# Patient Record
Sex: MALE | Race: BLACK OR AFRICAN AMERICAN | NOT HISPANIC OR LATINO | Employment: OTHER | URBAN - METROPOLITAN AREA
[De-identification: names, ages, dates, MRNs, and addresses within clinical notes are randomized per-mention and may not be internally consistent; named-entity substitution may affect disease eponyms.]

---

## 2019-02-13 ENCOUNTER — HISTORICAL (OUTPATIENT)
Dept: ADMINISTRATIVE | Facility: HOSPITAL | Age: 54
End: 2019-02-13

## 2019-02-13 LAB
ABS NEUT (OLG): 3.59 X10(3)/MCL (ref 2.1–9.2)
ALBUMIN SERPL-MCNC: 4.2 GM/DL (ref 3.4–5)
ALBUMIN/GLOB SERPL: 1.2 {RATIO}
ALP SERPL-CCNC: 55 UNIT/L (ref 50–136)
ALT SERPL-CCNC: 18 UNIT/L (ref 12–78)
AST SERPL-CCNC: 12 UNIT/L (ref 15–37)
BASOPHILS # BLD AUTO: 0 X10(3)/MCL (ref 0–0.2)
BASOPHILS NFR BLD AUTO: 0 %
BILIRUB SERPL-MCNC: 0.5 MG/DL (ref 0.2–1)
BILIRUBIN DIRECT+TOT PNL SERPL-MCNC: 0.1 MG/DL (ref 0–0.2)
BILIRUBIN DIRECT+TOT PNL SERPL-MCNC: 0.4 MG/DL (ref 0–0.8)
BUN SERPL-MCNC: 7 MG/DL (ref 7–18)
CALCIUM SERPL-MCNC: 8.9 MG/DL (ref 8.5–10.1)
CHLORIDE SERPL-SCNC: 109 MMOL/L (ref 98–107)
CO2 SERPL-SCNC: 29 MMOL/L (ref 21–32)
CREAT SERPL-MCNC: 1.15 MG/DL (ref 0.7–1.3)
EOSINOPHIL # BLD AUTO: 0 X10(3)/MCL (ref 0–0.9)
EOSINOPHIL NFR BLD AUTO: 0 %
ERYTHROCYTE [DISTWIDTH] IN BLOOD BY AUTOMATED COUNT: 14.4 % (ref 11.5–17)
GLOBULIN SER-MCNC: 3.4 GM/DL (ref 2.4–3.5)
GLUCOSE SERPL-MCNC: 68 MG/DL (ref 74–106)
HCT VFR BLD AUTO: 45.5 % (ref 42–52)
HGB BLD-MCNC: 14.3 GM/DL (ref 14–18)
LYMPHOCYTES # BLD AUTO: 2.6 X10(3)/MCL (ref 0.6–4.6)
LYMPHOCYTES NFR BLD AUTO: 40 %
MCH RBC QN AUTO: 26.2 PG (ref 27–31)
MCHC RBC AUTO-ENTMCNC: 31.4 GM/DL (ref 33–36)
MCV RBC AUTO: 83.3 FL (ref 80–94)
MONOCYTES # BLD AUTO: 0.3 X10(3)/MCL (ref 0.1–1.3)
MONOCYTES NFR BLD AUTO: 5 %
NEUTROPHILS # BLD AUTO: 3.59 X10(3)/MCL (ref 2.1–9.2)
NEUTROPHILS NFR BLD AUTO: 54 %
PLATELET # BLD AUTO: 192 X10(3)/MCL (ref 130–400)
PMV BLD AUTO: 10.2 FL (ref 9.4–12.4)
POTASSIUM SERPL-SCNC: 3.9 MMOL/L (ref 3.5–5.1)
PROT SERPL-MCNC: 7.6 GM/DL (ref 6.4–8.2)
PSA SERPL-MCNC: 0.29 NG/ML (ref 0–4)
RBC # BLD AUTO: 5.46 X10(6)/MCL (ref 4.7–6.1)
SODIUM SERPL-SCNC: 145 MMOL/L (ref 136–145)
WBC # SPEC AUTO: 6.6 X10(3)/MCL (ref 4.5–11.5)

## 2022-07-11 ENCOUNTER — HOSPITAL ENCOUNTER (EMERGENCY)
Facility: HOSPITAL | Age: 57
Discharge: HOME | End: 2022-07-11
Attending: FAMILY MEDICINE
Payer: COMMERCIAL

## 2022-07-11 VITALS
WEIGHT: 183 LBS | HEART RATE: 73 BPM | SYSTOLIC BLOOD PRESSURE: 157 MMHG | BODY MASS INDEX: 24.79 KG/M2 | RESPIRATION RATE: 20 BRPM | TEMPERATURE: 98 F | OXYGEN SATURATION: 98 % | DIASTOLIC BLOOD PRESSURE: 97 MMHG | HEIGHT: 72 IN

## 2022-07-11 DIAGNOSIS — M54.50 ACUTE EXACERBATION OF CHRONIC LOW BACK PAIN: Primary | ICD-10-CM

## 2022-07-11 DIAGNOSIS — G89.29 ACUTE EXACERBATION OF CHRONIC LOW BACK PAIN: Primary | ICD-10-CM

## 2022-07-11 PROCEDURE — 25000003 PHARM REV CODE 250: Performed by: PHYSICIAN ASSISTANT

## 2022-07-11 PROCEDURE — 63600175 PHARM REV CODE 636 W HCPCS: Performed by: PHYSICIAN ASSISTANT

## 2022-07-11 PROCEDURE — 99284 EMERGENCY DEPT VISIT MOD MDM: CPT | Mod: 25

## 2022-07-11 PROCEDURE — 96372 THER/PROPH/DIAG INJ SC/IM: CPT | Performed by: PHYSICIAN ASSISTANT

## 2022-07-11 RX ORDER — NALTREXONE HYDROCHLORIDE 50 MG/1
25 TABLET, FILM COATED ORAL DAILY
COMMUNITY
Start: 2022-07-05

## 2022-07-11 RX ORDER — BUSPIRONE HYDROCHLORIDE 30 MG/1
30 TABLET ORAL 2 TIMES DAILY
COMMUNITY
Start: 2022-07-05

## 2022-07-11 RX ORDER — MIRTAZAPINE 30 MG/1
30 TABLET, FILM COATED ORAL NIGHTLY
COMMUNITY
Start: 2022-07-05

## 2022-07-11 RX ORDER — METHOCARBAMOL 500 MG/1
1000 TABLET, FILM COATED ORAL
Status: COMPLETED | OUTPATIENT
Start: 2022-07-11 | End: 2022-07-11

## 2022-07-11 RX ORDER — PALIPERIDONE PALMITATE 546 MG/1.75ML
1 INJECTION, SUSPENSION, EXTENDED RELEASE INTRAMUSCULAR
COMMUNITY
Start: 2022-05-24

## 2022-07-11 RX ORDER — PRAVASTATIN SODIUM 20 MG/1
TABLET ORAL
COMMUNITY
Start: 2022-07-05

## 2022-07-11 RX ORDER — BENZTROPINE MESYLATE 1 MG/1
1 TABLET ORAL DAILY
COMMUNITY
Start: 2022-07-05

## 2022-07-11 RX ORDER — AMOXICILLIN 500 MG/1
500 CAPSULE ORAL 3 TIMES DAILY
COMMUNITY
Start: 2022-07-05

## 2022-07-11 RX ORDER — MORPHINE SULFATE 4 MG/ML
4 INJECTION, SOLUTION INTRAMUSCULAR; INTRAVENOUS
Status: COMPLETED | OUTPATIENT
Start: 2022-07-11 | End: 2022-07-11

## 2022-07-11 RX ORDER — DIVALPROEX SODIUM 500 MG/1
500 TABLET, FILM COATED, EXTENDED RELEASE ORAL NIGHTLY
COMMUNITY
Start: 2022-07-05

## 2022-07-11 RX ORDER — PALIPERIDONE 3 MG/1
3 TABLET, EXTENDED RELEASE ORAL DAILY
COMMUNITY
Start: 2022-06-06

## 2022-07-11 RX ORDER — DEXAMETHASONE SODIUM PHOSPHATE 4 MG/ML
8 INJECTION, SOLUTION INTRA-ARTICULAR; INTRALESIONAL; INTRAMUSCULAR; INTRAVENOUS; SOFT TISSUE
Status: COMPLETED | OUTPATIENT
Start: 2022-07-11 | End: 2022-07-11

## 2022-07-11 RX ORDER — BUPROPION HYDROCHLORIDE 150 MG/1
150 TABLET ORAL EVERY MORNING
COMMUNITY
Start: 2022-07-05

## 2022-07-11 RX ADMIN — DEXAMETHASONE SODIUM PHOSPHATE 8 MG: 4 INJECTION, SOLUTION INTRA-ARTICULAR; INTRALESIONAL; INTRAMUSCULAR; INTRAVENOUS; SOFT TISSUE at 04:07

## 2022-07-11 RX ADMIN — METHOCARBAMOL 1000 MG: 500 TABLET ORAL at 04:07

## 2022-07-11 RX ADMIN — MORPHINE SULFATE 4 MG: 4 INJECTION, SOLUTION INTRAMUSCULAR; INTRAVENOUS at 04:07

## 2022-07-11 NOTE — ED PROVIDER NOTES
Encounter Date: 7/11/2022       History     Chief Complaint   Patient presents with    Back Pain     Acute on chronic back pain that started last night that radiates down BL legs, pt states that he hurt his back on job in October 2021, and has back pain that is intermittent ever since, pt denies any trauma, states that he was sitting in a chair then needed help up when the pain started     56 y.o. male presents to the ED with neck flare-up of low back pain onset yesterday with radiation to bilateral posterior lower extremities.  Patient states he felt a sudden pain when standing up from a seated position.  Denies any active bleeding, trauma, bowel or bladder incontinence, saddle paresthesia.  Patient states he does see pain management for his neck.  Took Norco 10 today with relief for only 1 hour. States he was seeing surgeon after initial injury however has not been. Does not see PCP until August.     The history is provided by the patient. No  was used.   Back Pain   This is a chronic problem. The current episode started yesterday. The problem occurs throughout the day. The problem has been unchanged. The pain is associated with a recent injury. The pain is present in the lumbar spine. The quality of the pain is described as shooting. The pain radiates to the left leg and right leg. The pain is the same all the time. Stiffness is present all day. Pertinent negatives include no chest pain, no fever, no bowel incontinence, no perianal numbness, no bladder incontinence, no dysuria, no pelvic pain, no paresthesias, no paresis and no weakness. Treatments tried: norco 10. The treatment provided mild relief.     Review of patient's allergies indicates:  No Known Allergies  Past Medical History:   Diagnosis Date    Lumbago with sciatica     Schizoaffective disorder      History reviewed. No pertinent surgical history.  History reviewed. No pertinent family history.  Social History     Tobacco Use     Smoking status: Current Some Day Smoker    Smokeless tobacco: Never Used   Substance Use Topics    Drug use: Never     Review of Systems   Constitutional: Negative for fever.   HENT: Negative for sore throat.    Respiratory: Negative for shortness of breath.    Cardiovascular: Negative for chest pain.   Gastrointestinal: Negative for bowel incontinence and nausea.   Genitourinary: Negative for bladder incontinence, dysuria and pelvic pain.   Musculoskeletal: Positive for back pain.   Skin: Negative for rash.   Neurological: Negative for weakness and paresthesias.   Hematological: Does not bruise/bleed easily.   All other systems reviewed and are negative.      Physical Exam     Initial Vitals [07/11/22 1533]   BP Pulse Resp Temp SpO2   (!) 157/97 73 18 98.1 °F (36.7 °C) 98 %      MAP       --         Physical Exam    Nursing note and vitals reviewed.  Constitutional: He appears well-developed and well-nourished.   HENT:   Head: Normocephalic and atraumatic.   Eyes: EOM are normal. Pupils are equal, round, and reactive to light.   Neck: Neck supple.   Normal range of motion.  Cardiovascular: Normal rate, regular rhythm, normal heart sounds and intact distal pulses.   Pulmonary/Chest: Breath sounds normal.   Abdominal: Abdomen is soft.   Musculoskeletal:      Cervical back: Normal, normal range of motion and neck supple.      Thoracic back: Normal.      Lumbar back: Spasms and tenderness present. No swelling or bony tenderness. Decreased range of motion (due to pain). Positive right straight leg raise test and positive left straight leg raise test.     Neurological: He is alert and oriented to person, place, and time. He has normal strength. GCS score is 15. GCS eye subscore is 4. GCS verbal subscore is 5. GCS motor subscore is 6.   Skin: Skin is warm and dry. Capillary refill takes less than 2 seconds.   Psychiatric: He has a normal mood and affect.         ED Course   Procedures  Labs Reviewed - No data to  display       Imaging Results    None          Medications   dexamethasone injection 8 mg (8 mg Intramuscular Given 7/11/22 1623)   morphine injection 4 mg (4 mg Intramuscular Given 7/11/22 1623)   methocarbamoL tablet 1,000 mg (1,000 mg Oral Given 7/11/22 1623)     Medical Decision Making:   Differential Diagnosis:   Chronic back pain, sciatica, lumbar strain  ED Management:  Will give medication here for pain however will not be able to send him home with more RX due to him being on pain management. Encourage patient to call PCP for closer f/u appt if needed. Return to the ER if symptoms worsen                       Clinical Impression:   Final diagnoses:  [M54.50, G89.29] Acute exacerbation of chronic low back pain (Primary)          ED Disposition Condition    Discharge Stable        ED Prescriptions     None        Follow-up Information     Follow up With Specialties Details Why Contact Info    [facility] - Emergency Dept Emergency Medicine In 1 week If symptoms worsen [id number] [place]  [facility] [id number] [phone]    Primary care provider  In 2 days As needed            Jama Dooley PA-C  07/11/22 8807

## 2022-08-16 ENCOUNTER — HOSPITAL ENCOUNTER (INPATIENT)
Facility: HOSPITAL | Age: 57
LOS: 1 days | Discharge: HOME | DRG: 069 | End: 2022-08-17
Attending: STUDENT IN AN ORGANIZED HEALTH CARE EDUCATION/TRAINING PROGRAM | Admitting: INTERNAL MEDICINE
Payer: COMMERCIAL

## 2022-08-16 DIAGNOSIS — R53.1 WEAKNESS: Primary | ICD-10-CM

## 2022-08-16 DIAGNOSIS — R42 DIZZINESS: ICD-10-CM

## 2022-08-16 DIAGNOSIS — R07.9 CHEST PAIN: ICD-10-CM

## 2022-08-16 DIAGNOSIS — I63.9 STROKE: ICD-10-CM

## 2022-08-16 DIAGNOSIS — R47.81 DEFICIT IN COMMUNICATION DUE TO SLURRED SPEECH: ICD-10-CM

## 2022-08-16 LAB
ALBUMIN SERPL-MCNC: 4.3 GM/DL (ref 3.5–5)
ALBUMIN/GLOB SERPL: 1.4 RATIO (ref 1.1–2)
ALP SERPL-CCNC: 59 UNIT/L (ref 40–150)
ALT SERPL-CCNC: 16 UNIT/L (ref 0–55)
APPEARANCE UR: CLEAR
AST SERPL-CCNC: 19 UNIT/L (ref 5–34)
BACTERIA #/AREA URNS AUTO: NORMAL /HPF
BASOPHILS # BLD AUTO: 0.04 X10(3)/MCL (ref 0–0.2)
BASOPHILS NFR BLD AUTO: 0.5 %
BILIRUB UR QL STRIP.AUTO: NEGATIVE MG/DL
BILIRUBIN DIRECT+TOT PNL SERPL-MCNC: 0.3 MG/DL
BUN SERPL-MCNC: 8.4 MG/DL (ref 8.4–25.7)
CALCIUM SERPL-MCNC: 9.8 MG/DL (ref 8.4–10.2)
CHLORIDE SERPL-SCNC: 108 MMOL/L (ref 98–107)
CO2 SERPL-SCNC: 26 MMOL/L (ref 22–29)
COLOR UR AUTO: YELLOW
CREAT SERPL-MCNC: 1.09 MG/DL (ref 0.73–1.18)
EOSINOPHIL # BLD AUTO: 0.06 X10(3)/MCL (ref 0–0.9)
EOSINOPHIL NFR BLD AUTO: 0.7 %
ERYTHROCYTE [DISTWIDTH] IN BLOOD BY AUTOMATED COUNT: 13.9 % (ref 11.5–17)
GFR SERPLBLD CREATININE-BSD FMLA CKD-EPI: >60 MLS/MIN/1.73/M2
GLOBULIN SER-MCNC: 3 GM/DL (ref 2.4–3.5)
GLUCOSE SERPL-MCNC: 70 MG/DL (ref 74–100)
GLUCOSE UR QL STRIP.AUTO: NEGATIVE MG/DL
HCT VFR BLD AUTO: 47.2 % (ref 42–52)
HGB BLD-MCNC: 14.9 GM/DL (ref 14–18)
IMM GRANULOCYTES # BLD AUTO: 0.01 X10(3)/MCL (ref 0–0.04)
IMM GRANULOCYTES NFR BLD AUTO: 0.1 %
KETONES UR QL STRIP.AUTO: NEGATIVE MG/DL
LEUKOCYTE ESTERASE UR QL STRIP.AUTO: NEGATIVE UNIT/L
LYMPHOCYTES # BLD AUTO: 3.51 X10(3)/MCL (ref 0.6–4.6)
LYMPHOCYTES NFR BLD AUTO: 42.3 %
MCH RBC QN AUTO: 27.8 PG (ref 27–31)
MCHC RBC AUTO-ENTMCNC: 31.6 MG/DL (ref 33–36)
MCV RBC AUTO: 88.1 FL (ref 80–94)
MONOCYTES # BLD AUTO: 0.67 X10(3)/MCL (ref 0.1–1.3)
MONOCYTES NFR BLD AUTO: 8.1 %
NEUTROPHILS # BLD AUTO: 4 X10(3)/MCL (ref 2.1–9.2)
NEUTROPHILS NFR BLD AUTO: 48.3 %
NITRITE UR QL STRIP.AUTO: NEGATIVE
NRBC BLD AUTO-RTO: 0 %
PH UR STRIP.AUTO: 5.5 [PH]
PLATELET # BLD AUTO: 195 X10(3)/MCL (ref 130–400)
PMV BLD AUTO: 10.3 FL (ref 7.4–10.4)
POCT GLUCOSE: 79 MG/DL (ref 70–110)
POTASSIUM SERPL-SCNC: 4.6 MMOL/L (ref 3.5–5.1)
PROT SERPL-MCNC: 7.3 GM/DL (ref 6.4–8.3)
PROT UR QL STRIP.AUTO: NEGATIVE MG/DL
RBC # BLD AUTO: 5.36 X10(6)/MCL (ref 4.7–6.1)
RBC #/AREA URNS AUTO: <5 /HPF
RBC UR QL AUTO: NEGATIVE UNIT/L
SODIUM SERPL-SCNC: 142 MMOL/L (ref 136–145)
SP GR UR STRIP.AUTO: 1.01 (ref 1–1.03)
SQUAMOUS #/AREA URNS AUTO: <5 /HPF
TROPONIN I SERPL-MCNC: <0.01 NG/ML (ref 0–0.04)
UROBILINOGEN UR STRIP-ACNC: 0.2 MG/DL
WBC # SPEC AUTO: 8.3 X10(3)/MCL (ref 4.5–11.5)
WBC #/AREA URNS AUTO: <5 /HPF

## 2022-08-16 PROCEDURE — 85025 COMPLETE CBC W/AUTO DIFF WBC: CPT | Performed by: PHYSICIAN ASSISTANT

## 2022-08-16 PROCEDURE — 36415 COLL VENOUS BLD VENIPUNCTURE: CPT | Performed by: PHYSICIAN ASSISTANT

## 2022-08-16 PROCEDURE — 84484 ASSAY OF TROPONIN QUANT: CPT | Performed by: PHYSICIAN ASSISTANT

## 2022-08-16 PROCEDURE — 81001 URINALYSIS AUTO W/SCOPE: CPT | Performed by: PHYSICIAN ASSISTANT

## 2022-08-16 PROCEDURE — 80053 COMPREHEN METABOLIC PANEL: CPT | Performed by: PHYSICIAN ASSISTANT

## 2022-08-16 PROCEDURE — 93010 ELECTROCARDIOGRAM REPORT: CPT | Mod: ,,, | Performed by: INTERNAL MEDICINE

## 2022-08-16 PROCEDURE — 11000001 HC ACUTE MED/SURG PRIVATE ROOM

## 2022-08-16 PROCEDURE — 93005 ELECTROCARDIOGRAM TRACING: CPT

## 2022-08-16 PROCEDURE — 93010 EKG 12-LEAD: ICD-10-PCS | Mod: ,,, | Performed by: INTERNAL MEDICINE

## 2022-08-16 NOTE — FIRST PROVIDER EVALUATION
"Medical screening exam completed.  I have conducted a focused provider triage encounter, findings are as follows:    Chief Complaint   Patient presents with    Speech Problem     Pt wife reports 2 days history of slurred speech. Seen at [facility] 08/15 for similar complaint. Advised if symptoms did not improved to come to [facility] for eval and further workup.     Brief history of present illness:  56 y.o. male presents to the ED with wife for intermittent slurred speech, left leg tingling, and dry mouth for the last few days s/t c-spine injection. Seen at [facility] yesterday and offered admission for MRI however patient declined.     Vitals:    08/16/22 1736   BP: 131/81   BP Location: Left arm   Pulse: 89   Resp: 19   Temp: 98.4 °F (36.9 °C)   TempSrc: Oral   SpO2: 99%   Weight: 83.9 kg (185 lb)   Height: 5' 9" (1.753 m)       Pertinent physical exam:  Awake, alert, ambulatory, non-labored respirations    Brief workup plan:  Labs, EKG, evaluation     Preliminary workup initiated; this workup will be continued and followed by the physician or advanced practice provider that is assigned to the patient when roomed.  "

## 2022-08-16 NOTE — Clinical Note
Diagnosis: Weakness [107115]   Admitting Provider:: SAGRARIO SEGUNDO [[id number]]   Future Attending Provider: SAGRARIO SEGUNDO [[id number]]   Reason for IP Medical Treatment  (Clinical interventions that can only be accomplished in the IP setting? ) :: stroke work up, MRI   Estimated Length of Stay:: 2 midnights   I certify that Inpatient services for greater than or equal to 2 midnights are medically necessary:: Yes   Plans for Post-Acute care--if anticipated (pick the single best option):: A. No post acute care anticipated at this time

## 2022-08-17 VITALS
HEART RATE: 72 BPM | HEIGHT: 69 IN | TEMPERATURE: 98 F | WEIGHT: 185 LBS | BODY MASS INDEX: 27.4 KG/M2 | DIASTOLIC BLOOD PRESSURE: 87 MMHG | RESPIRATION RATE: 16 BRPM | SYSTOLIC BLOOD PRESSURE: 122 MMHG | OXYGEN SATURATION: 95 %

## 2022-08-17 PROBLEM — Z72.0 TOBACCO ABUSE: Status: ACTIVE | Noted: 2022-08-17

## 2022-08-17 PROBLEM — G89.29 CHRONIC PAIN: Status: ACTIVE | Noted: 2022-08-17

## 2022-08-17 PROBLEM — F10.10 ETOH ABUSE: Status: ACTIVE | Noted: 2022-08-17

## 2022-08-17 PROBLEM — G45.9 TIA (TRANSIENT ISCHEMIC ATTACK): Status: ACTIVE | Noted: 2022-08-17

## 2022-08-17 PROBLEM — R47.9 SPEECH DISTURBANCE: Status: ACTIVE | Noted: 2022-08-17

## 2022-08-17 PROBLEM — F25.9 SCHIZOAFFECTIVE DISORDER: Status: ACTIVE | Noted: 2022-08-17

## 2022-08-17 LAB
ALBUMIN SERPL-MCNC: 4 GM/DL (ref 3.5–5)
ALBUMIN/GLOB SERPL: 1.3 RATIO (ref 1.1–2)
ALP SERPL-CCNC: 54 UNIT/L (ref 40–150)
ALT SERPL-CCNC: 16 UNIT/L (ref 0–55)
AST SERPL-CCNC: 21 UNIT/L (ref 5–34)
AV INDEX (PROSTH): 0.77
AV MEAN GRADIENT: 2 MMHG
AV PEAK GRADIENT: 4 MMHG
AV VELOCITY RATIO: 0.86
BASOPHILS # BLD AUTO: 0.04 X10(3)/MCL (ref 0–0.2)
BASOPHILS NFR BLD AUTO: 0.6 %
BILIRUBIN DIRECT+TOT PNL SERPL-MCNC: 0.4 MG/DL
BSA FOR ECHO PROCEDURE: 2.02 M2
BUN SERPL-MCNC: 6.1 MG/DL (ref 8.4–25.7)
CALCIUM SERPL-MCNC: 9.5 MG/DL (ref 8.4–10.2)
CHLORIDE SERPL-SCNC: 106 MMOL/L (ref 98–107)
CHOLEST SERPL-MCNC: 152 MG/DL
CHOLEST/HDLC SERPL: 3 {RATIO} (ref 0–5)
CO2 SERPL-SCNC: 29 MMOL/L (ref 22–29)
CREAT SERPL-MCNC: 1.06 MG/DL (ref 0.73–1.18)
CV ECHO LV RWT: 0.38 CM
DOP CALC AO PEAK VEL: 0.99 M/S
DOP CALC AO VTI: 19.7 CM
DOP CALC LVOT PEAK VEL: 0.85 M/S
DOP CALCLVOT PEAK VEL VTI: 15.1 CM
E WAVE DECELERATION TIME: 217 MSEC
E/A RATIO: 1.25
E/E' RATIO: 6 M/S
ECHO LV POSTERIOR WALL: 0.8 CM (ref 0.6–1.1)
EJECTION FRACTION: 55 %
EOSINOPHIL # BLD AUTO: 0.07 X10(3)/MCL (ref 0–0.9)
EOSINOPHIL NFR BLD AUTO: 1 %
ERYTHROCYTE [DISTWIDTH] IN BLOOD BY AUTOMATED COUNT: 14.2 % (ref 11.5–17)
EST. AVERAGE GLUCOSE BLD GHB EST-MCNC: 88.2 MG/DL
ETHANOL SERPL-MCNC: <10 MG/DL
FRACTIONAL SHORTENING: 38 % (ref 28–44)
GFR SERPLBLD CREATININE-BSD FMLA CKD-EPI: >60 MLS/MIN/1.73/M2
GLOBULIN SER-MCNC: 3.2 GM/DL (ref 2.4–3.5)
GLUCOSE SERPL-MCNC: 93 MG/DL (ref 74–100)
HBA1C MFR BLD: 4.7 %
HCT VFR BLD AUTO: 46.4 % (ref 42–52)
HDLC SERPL-MCNC: 44 MG/DL (ref 35–60)
HGB BLD-MCNC: 14.6 GM/DL (ref 14–18)
HIV 1+2 AB+HIV1 P24 AG SERPL QL IA: NONREACTIVE
IMM GRANULOCYTES # BLD AUTO: 0.02 X10(3)/MCL (ref 0–0.04)
IMM GRANULOCYTES NFR BLD AUTO: 0.3 %
INR BLD: 0.99 (ref 0–1.3)
INTERVENTRICULAR SEPTUM: 1.02 CM (ref 0.6–1.1)
LDLC SERPL CALC-MCNC: 82 MG/DL (ref 50–140)
LEFT ATRIUM SIZE: 2.3 CM
LEFT ATRIUM VOLUME INDEX MOD: 16.4 ML/M2
LEFT ATRIUM VOLUME MOD: 32.7 CM3
LEFT INTERNAL DIMENSION IN SYSTOLE: 2.6 CM (ref 2.1–4)
LEFT VENTRICLE DIASTOLIC VOLUME INDEX: 38.4 ML/M2
LEFT VENTRICLE DIASTOLIC VOLUME: 76.8 ML
LEFT VENTRICLE MASS INDEX: 59 G/M2
LEFT VENTRICLE SYSTOLIC VOLUME INDEX: 12.3 ML/M2
LEFT VENTRICLE SYSTOLIC VOLUME: 24.6 ML
LEFT VENTRICULAR INTERNAL DIMENSION IN DIASTOLE: 4.16 CM (ref 3.5–6)
LEFT VENTRICULAR MASS: 118.62 G
LV LATERAL E/E' RATIO: 5.08 M/S
LV SEPTAL E/E' RATIO: 7.33 M/S
LVOT MG: 1 MMHG
LVOT MV: 0.51 CM/S
LYMPHOCYTES # BLD AUTO: 3.59 X10(3)/MCL (ref 0.6–4.6)
LYMPHOCYTES NFR BLD AUTO: 50.4 %
MAGNESIUM SERPL-MCNC: 2 MG/DL (ref 1.6–2.6)
MCH RBC QN AUTO: 27.9 PG (ref 27–31)
MCHC RBC AUTO-ENTMCNC: 31.5 MG/DL (ref 33–36)
MCV RBC AUTO: 88.7 FL (ref 80–94)
MONOCYTES # BLD AUTO: 0.51 X10(3)/MCL (ref 0.1–1.3)
MONOCYTES NFR BLD AUTO: 7.2 %
MV PEAK A VEL: 0.53 M/S
MV PEAK E VEL: 0.66 M/S
NEUTROPHILS # BLD AUTO: 2.9 X10(3)/MCL (ref 2.1–9.2)
NEUTROPHILS NFR BLD AUTO: 40.5 %
NRBC BLD AUTO-RTO: 0 %
PHOSPHATE SERPL-MCNC: 2.9 MG/DL (ref 2.3–4.7)
PLATELET # BLD AUTO: 181 X10(3)/MCL (ref 130–400)
PMV BLD AUTO: 9.8 FL (ref 7.4–10.4)
POTASSIUM SERPL-SCNC: 3.9 MMOL/L (ref 3.5–5.1)
PROT SERPL-MCNC: 7.2 GM/DL (ref 6.4–8.3)
PROTHROMBIN TIME: 13 SECONDS (ref 12.5–14.5)
PV PEAK VELOCITY: 0.73 CM/S
RA PRESSURE: 3 MMHG
RBC # BLD AUTO: 5.23 X10(6)/MCL (ref 4.7–6.1)
RIGHT VENTRICULAR END-DIASTOLIC DIMENSION: 4.32 CM
SODIUM SERPL-SCNC: 142 MMOL/L (ref 136–145)
T PALLIDUM AB SER QL: NONREACTIVE
TDI LATERAL: 0.13 M/S
TDI SEPTAL: 0.09 M/S
TDI: 0.11 M/S
TRICUSPID ANNULAR PLANE SYSTOLIC EXCURSION: 1.91 CM
TRIGL SERPL-MCNC: 131 MG/DL (ref 34–140)
VLDLC SERPL CALC-MCNC: 26 MG/DL
WBC # SPEC AUTO: 7.1 X10(3)/MCL (ref 4.5–11.5)

## 2022-08-17 PROCEDURE — 85025 COMPLETE CBC W/AUTO DIFF WBC: CPT | Performed by: NURSE PRACTITIONER

## 2022-08-17 PROCEDURE — 84100 ASSAY OF PHOSPHORUS: CPT | Performed by: NURSE PRACTITIONER

## 2022-08-17 PROCEDURE — 86780 TREPONEMA PALLIDUM: CPT | Performed by: STUDENT IN AN ORGANIZED HEALTH CARE EDUCATION/TRAINING PROGRAM

## 2022-08-17 PROCEDURE — A9577 INJ MULTIHANCE: HCPCS | Performed by: INTERNAL MEDICINE

## 2022-08-17 PROCEDURE — 80061 LIPID PANEL: CPT | Performed by: STUDENT IN AN ORGANIZED HEALTH CARE EDUCATION/TRAINING PROGRAM

## 2022-08-17 PROCEDURE — 83735 ASSAY OF MAGNESIUM: CPT | Performed by: NURSE PRACTITIONER

## 2022-08-17 PROCEDURE — 63600175 PHARM REV CODE 636 W HCPCS

## 2022-08-17 PROCEDURE — 63600175 PHARM REV CODE 636 W HCPCS: Performed by: NURSE PRACTITIONER

## 2022-08-17 PROCEDURE — 85610 PROTHROMBIN TIME: CPT | Performed by: STUDENT IN AN ORGANIZED HEALTH CARE EDUCATION/TRAINING PROGRAM

## 2022-08-17 PROCEDURE — 36415 COLL VENOUS BLD VENIPUNCTURE: CPT | Performed by: PHYSICIAN ASSISTANT

## 2022-08-17 PROCEDURE — 97165 OT EVAL LOW COMPLEX 30 MIN: CPT

## 2022-08-17 PROCEDURE — 82077 ASSAY SPEC XCP UR&BREATH IA: CPT | Performed by: PHYSICIAN ASSISTANT

## 2022-08-17 PROCEDURE — 25500020 PHARM REV CODE 255: Performed by: INTERNAL MEDICINE

## 2022-08-17 PROCEDURE — 97161 PT EVAL LOW COMPLEX 20 MIN: CPT

## 2022-08-17 PROCEDURE — 80053 COMPREHEN METABOLIC PANEL: CPT | Performed by: NURSE PRACTITIONER

## 2022-08-17 PROCEDURE — 87389 HIV-1 AG W/HIV-1&-2 AB AG IA: CPT | Performed by: STUDENT IN AN ORGANIZED HEALTH CARE EDUCATION/TRAINING PROGRAM

## 2022-08-17 PROCEDURE — 25000003 PHARM REV CODE 250: Performed by: STUDENT IN AN ORGANIZED HEALTH CARE EDUCATION/TRAINING PROGRAM

## 2022-08-17 PROCEDURE — 83036 HEMOGLOBIN GLYCOSYLATED A1C: CPT | Performed by: STUDENT IN AN ORGANIZED HEALTH CARE EDUCATION/TRAINING PROGRAM

## 2022-08-17 PROCEDURE — 92610 EVALUATE SWALLOWING FUNCTION: CPT

## 2022-08-17 RX ORDER — SODIUM CHLORIDE 0.9 % (FLUSH) 0.9 %
10 SYRINGE (ML) INJECTION
Status: DISCONTINUED | OUTPATIENT
Start: 2022-08-17 | End: 2022-08-17 | Stop reason: HOSPADM

## 2022-08-17 RX ORDER — ATORVASTATIN CALCIUM 40 MG/1
40 TABLET, FILM COATED ORAL DAILY
Status: DISCONTINUED | OUTPATIENT
Start: 2022-08-17 | End: 2022-08-17 | Stop reason: HOSPADM

## 2022-08-17 RX ORDER — CLOPIDOGREL BISULFATE 75 MG/1
75 TABLET ORAL DAILY
Status: DISCONTINUED | OUTPATIENT
Start: 2022-08-17 | End: 2022-08-17 | Stop reason: HOSPADM

## 2022-08-17 RX ORDER — MORPHINE SULFATE 4 MG/ML
INJECTION, SOLUTION INTRAMUSCULAR; INTRAVENOUS
Status: COMPLETED
Start: 2022-08-17 | End: 2022-08-17

## 2022-08-17 RX ORDER — MORPHINE SULFATE 4 MG/ML
4 INJECTION, SOLUTION INTRAMUSCULAR; INTRAVENOUS
Status: COMPLETED | OUTPATIENT
Start: 2022-08-17 | End: 2022-08-17

## 2022-08-17 RX ORDER — NAPROXEN SODIUM 220 MG/1
81 TABLET, FILM COATED ORAL DAILY
Status: DISCONTINUED | OUTPATIENT
Start: 2022-08-17 | End: 2022-08-17 | Stop reason: HOSPADM

## 2022-08-17 RX ORDER — SODIUM CHLORIDE 0.9 % (FLUSH) 0.9 %
10 SYRINGE (ML) INJECTION EVERY 12 HOURS PRN
Status: DISCONTINUED | OUTPATIENT
Start: 2022-08-17 | End: 2022-08-17 | Stop reason: HOSPADM

## 2022-08-17 RX ORDER — NALOXONE HCL 0.4 MG/ML
0.02 VIAL (ML) INJECTION
Status: DISCONTINUED | OUTPATIENT
Start: 2022-08-17 | End: 2022-08-17 | Stop reason: HOSPADM

## 2022-08-17 RX ORDER — NAPROXEN SODIUM 220 MG/1
81 TABLET, FILM COATED ORAL DAILY
Qty: 30 TABLET | Refills: 11 | Status: SHIPPED | OUTPATIENT
Start: 2022-08-18 | End: 2023-08-18

## 2022-08-17 RX ORDER — CLOPIDOGREL BISULFATE 75 MG/1
75 TABLET ORAL DAILY
Qty: 21 TABLET | Refills: 0 | Status: SHIPPED | OUTPATIENT
Start: 2022-08-18 | End: 2022-09-08

## 2022-08-17 RX ORDER — MORPHINE SULFATE 4 MG/ML
4 INJECTION, SOLUTION INTRAMUSCULAR; INTRAVENOUS EVERY 4 HOURS PRN
Status: DISCONTINUED | OUTPATIENT
Start: 2022-08-17 | End: 2022-08-17 | Stop reason: HOSPADM

## 2022-08-17 RX ADMIN — CLOPIDOGREL 75 MG: 75 TABLET, FILM COATED ORAL at 12:08

## 2022-08-17 RX ADMIN — ATORVASTATIN CALCIUM 40 MG: 40 TABLET, FILM COATED ORAL at 12:08

## 2022-08-17 RX ADMIN — ASPIRIN 81 MG 81 MG: 81 TABLET ORAL at 12:08

## 2022-08-17 RX ADMIN — MORPHINE SULFATE 4 MG: 4 INJECTION INTRAVENOUS at 05:08

## 2022-08-17 RX ADMIN — GADOBENATE DIMEGLUMINE 18 ML: 529 INJECTION, SOLUTION INTRAVENOUS at 11:08

## 2022-08-17 RX ADMIN — MORPHINE SULFATE 4 MG: 4 INJECTION INTRAVENOUS at 12:08

## 2022-08-17 RX ADMIN — MORPHINE SULFATE 4 MG: 4 INJECTION, SOLUTION INTRAMUSCULAR; INTRAVENOUS at 12:08

## 2022-08-17 NOTE — PT/OT/SLP EVAL
Occupational Therapy   Evaluation and Discharge Note    Name: Carlos Molina  MRN: [id number]  Admitting Diagnosis:  Speech disturbance   Recent Surgery: * No surgery found *      Recommendations:     Discharge Recommendations: home  Discharge Equipment Recommendations:  none    Assessment:     Carlos Molina is a 56 y.o. male with a medical diagnosis of tongue heaviness, difficulty getting words out, dizziness, numbness BLE, r/o CVA.  MRI indicates schmorls node T1 endplate, C3-4, C5-6 mild degenerative stenosis. At this time, patient is functioning at their prior level of function and does not require further acute OT services.     Plan:     During this hospitalization, patient does not require further acute OT services.  Please re-consult if situation changes.    · Plan of Care Reviewed with:      Subjective     Chief Complaint: no complaints  Patient/Family Comments/goals: to go home    Occupational Profile:  Living Environment: lives with Lorraine, threshold, tub/shower  Previous level of function: independent, does not work due to job related injury, Lorraine assists with cooking/cleaning.    Equipment Used at home:  none  Assistance upon Discharge: Lorraine    Pain/Comfort:  · Pain Rating 1:  (some discomfort in traps with shoulder flexion at approx 100)  · Pain Rating Post-Intervention 1: 0/10    Patients cultural, spiritual, Baptism conflicts given the current situation: no    Objective:     Communicated with: RN prior to session.  Patient found HOB elevated with Other (comments) (with wife, in stretcher, BP and telemetry connected) upon OT entry to room.    General Precautions: Standard,     Orthopedic Precautions:N/A   Braces: N/A  Respiratory Status: Room air   /92  Occupational Performance:    Bed Mobility:    · Patient completed Supine to Sit with independence     Functional Mobility/Transfers:  · Patient completed Sit <> Stand Transfer with independence  with  no assistive device   · Functional  Mobility: Pt able to ambulate around ED hallways, no LOB, no assist required.      Activities of Daily Living:  · Lower Body Dressing: independence, shoes/socks.  · Toileting: independence .    Cognitive/Visual Perceptual:  Cognitive/Psychosocial Skills:     -       Oriented to: Person, Place, Time and Situation   -       Follows Commands/attention:Follows multistep  commands  -       Safety awareness/insight to disability: intact     Physical Exam:  Upper Extremity Strength:    -       Right Upper Extremity: WFL  -       Left Upper Extremity: WFL  Neurological:    -       no coordination deficits      Treatment & Education:  Good tolerance, no concerns related to returning home.  Reports some numbness in L hand/forearm/humerus/neck- going to see neurosx outpatient per neurologist.  Education:    Patient left sitting edge of bed with Lorraine present, lines reconnected.  MD entered during eval and reported pt without CVA and to be d/c and follow up with neurosx outpatient       OT signing off at this time, pt reports he has no concerns related to returning home.  Lorraine can assist if needed.    GOALS:   Multidisciplinary Problems     Occupational Therapy Goals     Not on file                History:     Past Medical History:   Diagnosis Date    Lumbago with sciatica     Schizoaffective disorder        History reviewed. No pertinent surgical history.    Time Tracking:     OT Date of Treatment:    OT Start Time: 1336  OT Stop Time: 1349  OT Total Time (min): 13 min    Billable Minutes:Evaluation LOW    8/17/2022

## 2022-08-17 NOTE — PROGRESS NOTES
Met with patient to conduct initial  dc planning assessment. Pt lives at [address] [place] [address] Dr. Pacheco HEWITT with his girlfriend, Lorraine Verde [phone]. There is running water and electricity in the home. There is one step to enter and no stairs within. PH [phone]. Pt is single with 5 children. He is not working due to disability. He completes all ADLs independently. His girlfriend assists with transportation and will be bringing him home upon dc. PCP Dr. Damon. No agency involvement or DME. He wears glasses. No  hx. He is able to afford food and meds and fills with [facility] in [place]. He does not have a living will or POA. He denied known needs prior to dc.

## 2022-08-17 NOTE — SUBJECTIVE & OBJECTIVE
Past Medical History:   Diagnosis Date    Lumbago with sciatica     Schizoaffective disorder        History reviewed. No pertinent surgical history.    Review of patient's allergies indicates:  No Known Allergies    Current Neurological Medications:     No current facility-administered medications on file prior to encounter.     Current Outpatient Medications on File Prior to Encounter   Medication Sig    amoxicillin (AMOXIL) 500 MG capsule Take 500 mg by mouth 3 (three) times daily.    benztropine (COGENTIN) 1 MG tablet Take 1 mg by mouth once daily.    buPROPion (WELLBUTRIN XL) 150 MG TB24 tablet Take 150 mg by mouth every morning.    busPIRone (BUSPAR) 30 MG Tab Take 30 mg by mouth 2 (two) times daily.    divalproex ER (DEPAKOTE) 500 MG Tb24 Take 500 mg by mouth nightly.    INVEGA TRINZA 546 mg/1.75 mL Syrg Inject 1 Syringe into the muscle every 3 (three) months.    mirtazapine (REMERON) 30 MG tablet Take 30 mg by mouth nightly.    naltrexone (DEPADE) 50 mg tablet Take 25 mg by mouth once daily.    paliperidone (INVEGA) 3 MG TR24 Take 3 mg by mouth once daily.    pravastatin (PRAVACHOL) 20 MG tablet SMARTSI Tablet(s) By Mouth Every Evening     Family History    None       Tobacco Use    Smoking status: Current Some Day Smoker    Smokeless tobacco: Never Used   Substance and Sexual Activity    Alcohol use: Not on file    Drug use: Never    Sexual activity: Not on file     Review of Systems  A 14pt ros was reviewed & is negative unless o/w documented in the hpi    Objective:     Vital Signs (Most Recent):  Temp: 98.4 °F (36.9 °C) (22 1736)  Pulse: 67 (22 0701)  Resp: 17 (22 0526)  BP: 101/70 (22 0701)  SpO2: 96 % (22 0701) Vital Signs (24h Range):  Temp:  [98.4 °F (36.9 °C)] 98.4 °F (36.9 °C)  Pulse:  [64-89] 67  Resp:  [16-19] 17  SpO2:  [95 %-100 %] 96 %  BP: (101-143)/(70-95) 101/70     Weight: 83.9 kg (185 lb)  Body mass index is 27.32 kg/m².    Physical Exam  Vitals reviewed.    Constitutional:       General: He is awake.      Appearance: Normal appearance.   HENT:      Head: Normocephalic.      Nose: Nose normal.      Mouth/Throat:      Mouth: Mucous membranes are moist.      Pharynx: Oropharynx is clear.   Eyes:      Extraocular Movements: Extraocular movements intact and EOM normal.      Pupils: Pupils are equal, round, and reactive to light.   Pulmonary:      Effort: Pulmonary effort is normal.   Musculoskeletal:         General: Normal range of motion.      Cervical back: Rigidity present.   Skin:     General: Skin is warm and dry.   Neurological:      Mental Status: He is alert and oriented to person, place, and time.      Coordination: Finger-Nose-Finger Test normal.      Deep Tendon Reflexes:      Reflex Scores:       Bicep reflexes are 2+ on the right side and 2+ on the left side.       Brachioradialis reflexes are 2+ on the right side and 2+ on the left side.       Patellar reflexes are 2+ on the right side and 2+ on the left side.       Achilles reflexes are 2+ on the right side and 2+ on the left side.  Psychiatric:         Mood and Affect: Mood normal.         Speech: Speech normal.         Behavior: Behavior normal. Behavior is cooperative.         Thought Content: Thought content normal.         Judgment: Judgment normal.       NEUROLOGICAL EXAMINATION:     MENTAL STATUS   Oriented to person, place, and time.   Follows 3 step commands.   Attention: normal. Concentration: normal.   Speech: speech is normal   Level of consciousness: alert  Knowledge: good.   Normal comprehension.     CRANIAL NERVES     CN II   Visual fields full to confrontation.   Visual acuity: (visual acuity normal, w/ mild b/l blurry vision)    CN III, IV, VI   Pupils are equal, round, and reactive to light.  Extraocular motions are normal.   Nystagmus: none   Diplopia: none  Conjugate gaze: present    CN V   Facial sensation intact.     CN VII   Facial expression full, symmetric.     CN VIII   CN VIII  normal.     CN IX, X   CN IX normal.   CN X normal.     CN XI   CN XI normal.     CN XII   CN XII normal.     MOTOR EXAM   Muscle bulk: normal  Overall muscle tone: normal  Right arm pronator drift: absent  Left arm pronator drift: absent    Strength   Right neck flexion: 4/5  Left neck flexion: 4/5  Right neck extension: 4/5  Left neck extension: 4/5  Right deltoid: 5/5  Left deltoid: 5/5  Right biceps: 5/5  Left biceps: 5/5  Right triceps: 5/5  Left triceps: 5/5  Right quadriceps: 5/5  Left quadriceps: 5/5  Right hamstrin/5  Left hamstrin/5  Right glutei: 5/5  Left glutei: 5/5  Right anterior tibial: 5/5  Left anterior tibial: 5/5  Right posterior tibial: 5/5  Left posterior tibial: 5/5    REFLEXES     Reflexes   Right brachioradialis: 2+  Left brachioradialis: 2+  Right biceps: 2+  Left biceps: 2+  Right patellar: 2+  Left patellar: 2+  Right achilles: 2+  Left achilles: 2+  Right plantar: normal  Left plantar: normal  Right ankle clonus: absent  Left ankle clonus: absent    SENSORY EXAM   Light touch normal.     GAIT AND COORDINATION      Coordination   Finger to nose coordination: normal    Significant Labs:   Recent Lab Results  (Last 5 results in the past 24 hours)        22  0805   22  0020   22  1755   22  1740        Albumin/Globulin Ratio 1.3       1.4         Albumin 4.0       4.3         Alcohol, Serum   <10.0             Alkaline Phosphatase 54       59         ALT 16       16         Appearance, UA     Clear           AST 21       19         Bacteria, UA     None Seen           Baso # 0.04       0.04         Basophil % 0.6       0.5         BILIRUBIN TOTAL 0.4       0.3         Bilirubin, UA     Negative           BUN 6.1       8.4         Calcium 9.5       9.8         Chloride 106       108         CO2 29       26         Color, UA     Yellow           Creatinine 1.06       1.09         eGFR >60       >60         Eos # 0.07       0.06          Eosinophil % 1.0       0.7         Globulin, Total 3.2       3.0         Glucose 93       70         Glucose, UA     Negative           Hematocrit 46.4       47.2         Hemoglobin 14.6       14.9         Immature Grans (Abs) 0.02       0.01         Immature Granulocytes 0.3       0.1         Ketones, UA     Negative           Leukocytes, UA     Negative           Lymph # 3.59       3.51         LYMPH % 50.4       42.3         Magnesium 2.00               MCH 27.9       27.8         MCHC 31.5       31.6         MCV 88.7       88.1         Mono # 0.51       0.67         Mono % 7.2       8.1         MPV 9.8       10.3         Neut # 2.9       4.0         Neut % 40.5       48.3         NITRITE UA     Negative           nRBC 0.0       0.0         Occult Blood UA     Negative           pH, UA     5.5           Phosphorus 2.9               Platelets 181       195         POCT Glucose         79       Potassium 3.9       4.6         PROTEIN TOTAL 7.2       7.3         Protein, UA     Negative           RBC 5.23       5.36         RBC, UA     <5           RDW 14.2       13.9         Sodium 142       142         Specific Gravity,UA     1.015           Squam Epithel, UA     <5           Troponin I       <0.010         Urobilinogen, UA     0.2           WBC, UA     <5           WBC 7.1       8.3                                Significant Imaging:   MRI brain w/o 8/17/2022:  Findings:  Hemorrhage: No acute intracranial hemorrhage is identified.  Stroke: No abnormal signal is identified on the diffusion images to suggest acute infarct.  Extra axial spaces: The ventricles sulci and basal cisterns are within normal limits. Age appropriate.  Cerebral, cerebellar, and brainstem parenchyma: Mild scattered periventricular and subcortical white matter signal abnormalities are seen. The main consideration is small vessel ischemic changes. Stable since prior study.  Calvarium: Within normal limits.  Visualized paranasal sinuses:  Normal.  Visualized orbits: The visualized orbits appear unremarkable.  Sella and skull base: Normal.  Temporal bones and mastoids: There is T2 hyperintensity of the left mastoid air cells , suggesting mastoiditis. Correlating with partial sclerosis of left mastoid air cells on prior CT study.        Impression:  1. No acute intracranial process is identified. Details and other findings as discussed above.    I have reviewed all pertinent imaging results/findings within the past 24 hours.

## 2022-08-17 NOTE — ED NOTES
Pt returned from MRI. Family @ bedside. C/o pain to neck and legs 9/10. Pt states has chronic back pain and is hurting from laying flat in MRI machine

## 2022-08-17 NOTE — PT/OT/SLP EVAL
Physical Therapy Evaluation and Discharge Note    Patient Name:  Carlos Molina   MRN:  [id number]    Recommendations:     Discharge Recommendations:  home   Discharge Equipment Recommendations: none   Barriers to discharge: None    Assessment:     Carlos Molina is a 56 y.o. male admitted with a medical diagnosis of <principal problem not specified>. .  At this time, patient is functioning at their prior level of function and does not require further acute PT services.     Recent Surgery: * No surgery found *      Plan:     During this hospitalization, patient does not require further acute PT services.  Please re-consult if situation changes.      Subjective     Chief Complaint: none  Patient/Family Comments/goals: none  Pain/Comfort:  ·      Patients cultural, spiritual, Nondenominational conflicts given the current situation: no    Living Environment:  Lives with spouse in house with 1 step.   Prior to admission, patients level of function was independent.  Equipment used at home: none.  DME owned (not currently used): none.  Upon discharge, patient will have assistance from spouse.    Objective:     Communicated with nurse prior to session.  Patient found HOB elevated with telemetry upon PT entry to room.    General Precautions: Standard,     Orthopedic Precautions:N/A   Braces: N/A   Respiratory Status: Room air    Exams:  · Cognitive Exam:  Patient is oriented to Person, Place, Time and Situation  · Sensation:    · -       Intact  · RLE ROM: WNL  · RLE Strength: WNL  · LLE ROM: WNL  · LLE Strength: WNL    Functional Mobility:  · Bed Mobility:     · Supine to Sit: independence  · Sit to Supine: independence  · Transfers:     · Sit to Stand:  independence with no AD  · Gait: 450 ft independently  · Balance: good    AM-PAC 6 CLICK MOBILITY  Total Score:24       AM-PAC 6 CLICK MOBILITY  Total Score:24     Patient left HOB elevated with all lines intact, call button in reach and spouse present.    GOALS:    Multidisciplinary Problems     Physical Therapy Goals     Not on file                History:     Past Medical History:   Diagnosis Date    Lumbago with sciatica     Schizoaffective disorder        History reviewed. No pertinent surgical history.    Time Tracking:     PT Received On: 08/17/22  PT Start Time: 1300     PT Stop Time: 1315  PT Total Time (min): 15 min     Billable Minutes: Evaluation 15 minutes      08/17/2022

## 2022-08-17 NOTE — ASSESSMENT & PLAN NOTE
With associated tongue heaviness, neck pain, and paresthesias to BUE S/P cervical injections  Await MRI cervical spine w w/o to r/o infection

## 2022-08-17 NOTE — ED PROVIDER NOTES
Encounter Date: 8/16/2022       History     Chief Complaint   Patient presents with    Speech Problem     Pt wife reports 2 days history of slurred speech. Seen at [facility] 08/15 for similar complaint. Advised if symptoms did not improved to come to [facility] for eval and further workup.       56 year old male with hx of chronic back pain, schizoaffective disorder presents to ED for slurred speech, tongue heaviness, difficulty getting words out, dry mouth, dizziness, numbness to bilateral upper extremities, chest pain, headache, and neck pain.  Patient reports symptoms began after neck pain injection (C4-C5, C6-C7) on 08/05 by Dr. Valles.  Patient reports he was seen at [facility] yesterday with CT scans done and was offered admission for MRI.  Patient reports he declined.  Patient reports today symptoms remain the same, prompting him to [facility] for MRI.        Review of patient's allergies indicates:  No Known Allergies  Past Medical History:   Diagnosis Date    Lumbago with sciatica     Schizoaffective disorder      History reviewed. No pertinent surgical history.  History reviewed. No pertinent family history.  Social History     Tobacco Use    Smoking status: Current Some Day Smoker    Smokeless tobacco: Never Used   Substance Use Topics    Drug use: Never     Review of Systems   Constitutional: Negative for fever.   HENT: Negative.    Eyes: Negative.    Respiratory: Negative for cough and shortness of breath.    Cardiovascular: Positive for chest pain.   Gastrointestinal: Negative for abdominal pain, diarrhea, nausea and vomiting.   Endocrine: Negative.    Genitourinary: Negative.    Musculoskeletal: Positive for arthralgias and neck pain. Negative for back pain and myalgias.   Skin: Negative.    Allergic/Immunologic: Negative.    Neurological: Positive for numbness and headaches. Negative for dizziness.   Hematological: Negative.    Psychiatric/Behavioral: Negative.    All other systems  reviewed and are negative.      Physical Exam     Initial Vitals [08/16/22 1736]   BP Pulse Resp Temp SpO2   131/81 89 19 98.4 °F (36.9 °C) 99 %      MAP       --         Physical Exam    Nursing note and vitals reviewed.  Constitutional: He appears well-developed.   HENT:   Head: Normocephalic and atraumatic.   Eyes: EOM are normal. Pupils are equal, round, and reactive to light.   Neck: Neck supple.   Normal range of motion.  Cardiovascular: Normal rate, regular rhythm, normal heart sounds and intact distal pulses.   Pulmonary/Chest: Breath sounds normal. No respiratory distress.   Abdominal: Abdomen is soft. Bowel sounds are normal. He exhibits no distension. There is no abdominal tenderness.   Musculoskeletal:         General: Normal range of motion.      Cervical back: Normal range of motion and neck supple.     Neurological: He is alert and oriented to person, place, and time. He has normal strength. No cranial nerve deficit or sensory deficit. GCS eye subscore is 4. GCS verbal subscore is 5. GCS motor subscore is 6.   Strength equal in bilateral upper and lower extremities; No facial droop; Intermittent slurring of speech    Skin: Skin is warm and dry.   Psychiatric: He has a normal mood and affect.         ED Course   Procedures  Labs Reviewed   COMPREHENSIVE METABOLIC PANEL - Abnormal; Notable for the following components:       Result Value    Chloride 108 (*)     Glucose Level 70 (*)     All other components within normal limits   CBC WITH DIFFERENTIAL - Abnormal; Notable for the following components:    MCHC 31.6 (*)     All other components within normal limits   TROPONIN I - Normal   URINALYSIS, REFLEX TO URINE CULTURE - Normal   URINALYSIS, MICROSCOPIC - Normal   CBC W/ AUTO DIFFERENTIAL    Narrative:     The following orders were created for panel order CBC auto differential.  Procedure                               Abnormality         Status                     ---------                                -----------         ------                     CBC with Differential[[id number]]        Abnormal            Final result                 Please view results for these tests on the individual orders.   DRUG SCREEN, URINE (BEAKER)   ALCOHOL,MEDICAL (ETHANOL)   POCT GLUCOSE     EKG Readings: (Independently Interpreted)   Initial Reading: No STEMI. Rhythm: Normal Sinus Rhythm. Heart Rate: 88. Axis: Normal.       Imaging Results          CT Head Without Contrast (Preliminary result)  Result time 08/16/22 23:59:36    Preliminary result by Interface, Rad Results In (08/16/22 23:59:36)                 Narrative:    START OF REPORT:  Technique: CT of the head was performed without intravenous contrast with axial as well as coronal and sagittal images.    Comparison: None.    Dosage Information: Automated exposure control was utilized.    Clinical history: Pt wife reports 2 days history of slurred speech. Seen at [facility] 08/15 for similar complaint. Advised if symptoms did not improved to come to [facility] for eval and further workup.    Findings:  Hemorrhage: No acute intracranial hemorrhage is seen.  CSF spaces: The ventricles sulci and basal cisterns are within normal limits for age.  Brain parenchyma: Unremarkable with preservation of the grey white junction throughout.  Cerebellum: Unremarkable.  Sella and skull base: The sella appears to be within normal limits for age.  Intracranial calcifications: Incidental note is made of bilateral choroid plexus calcification. Incidental note is made of some pineal region calcification.  Calvarium: No acute linear or depressed skull fracture is seen.    Maxillofacial Structures:  Paranasal sinuses: The visualized paranasal sinuses appear clear with no significant mucoperiosteal thickening or air fluid levels identified.  Orbits: The orbits appear unremarkable.  Zygomatic arches: The zygomatic arches are intact and unremarkable.  Temporal bones and mastoids: The temporal bones and  mastoids appear unremarkable.  TMJ: The mandibular condyles appear normally placed with respect to the mandibular fossa.      Impression:  1. No acute intracranial process identified. Details and findings as noted above.                      Preliminary result by Florin Alves Jr., MD (08/16/22 23:59:36)                 Narrative:    START OF REPORT:  Technique: CT of the head was performed without intravenous contrast with axial as well as coronal and sagittal images.    Comparison: None.    Dosage Information: Automated exposure control was utilized.    Clinical history: Pt wife reports 2 days history of slurred speech. Seen at [facility] 08/15 for similar complaint. Advised if symptoms did not improved to come to [facility] for eval and further workup.    Findings:  Hemorrhage: No acute intracranial hemorrhage is seen.  CSF spaces: The ventricles sulci and basal cisterns are within normal limits for age.  Brain parenchyma: Unremarkable with preservation of the grey white junction throughout.  Cerebellum: Unremarkable.  Sella and skull base: The sella appears to be within normal limits for age.  Intracranial calcifications: Incidental note is made of bilateral choroid plexus calcification. Incidental note is made of some pineal region calcification.  Calvarium: No acute linear or depressed skull fracture is seen.    Maxillofacial Structures:  Paranasal sinuses: The visualized paranasal sinuses appear clear with no significant mucoperiosteal thickening or air fluid levels identified.  Orbits: The orbits appear unremarkable.  Zygomatic arches: The zygomatic arches are intact and unremarkable.  Temporal bones and mastoids: The temporal bones and mastoids appear unremarkable.  TMJ: The mandibular condyles appear normally placed with respect to the mandibular fossa.      Impression:  1. No acute intracranial process identified. Details and findings as noted above.                                   Medications   morphine  injection 4 mg (4 mg Intravenous Given 8/17/22 0019)     Medical Decision Making:   Other:   I have discussed this case with another health care provider.       <> Summary of the Discussion: Discussed case with MIGUEL Wise with hospitalist- patient to be admitted; CT head then MRI   Discussed case with Dr. Francisco- he is aware of patient's admission                       Clinical Impression:   Final diagnoses:  [R53.1] Weakness (Primary)  [R42] Dizziness          ED Disposition Condition    Admit               Jama Brewer PA-C  08/17/22 0039

## 2022-08-17 NOTE — HPI
56-year-old male with PMH lumbago with sciatica, schizoaffective disorder, and chronic neck pain who presents to ED on 08/16 with C/0 slurred speech, tongue heaviness, difficulty getting words out, dry mouth, dizziness, numbness to BUE, chest pain, HA, and neck pain S/P neck injections (C4-C5, C6-C7 1/5) patient reportedly received neck injections for chronic pain for several years and has not had previously this is symptoms following injections.  Patient reports neck tenderness with movement that radiates to b/l shoulders with associated paresthesias to BUE and pain at injection site.  Patient's significant other at bedtime and reports some slurred speech earlier, however no slurred speech or dysarthria noted on exam.  Facial grimacing noted with neck flexion/extension w/o limited ROM.  Patient denies fever, chills, or body aches.  MRI brain without unrevealing for acute intracranial processes.   
n/a

## 2022-08-17 NOTE — DISCHARGE SUMMARY
[facility] Hospital Medicine Discharge Summary    Admit Date: 8/16/2022  Discharge Date and Time: 8/17/20222:56 PM  Admitting Physician:   Discharging Physician:  Primary Care Physician: Suman Damon MD  Consults: Neurology    Discharge Diagnoses:  TIA  Chronic pain secondary to trauma several years ago, on chronic pain meds.    Schizoaffective disorder, on multiple psych meds at home.    Polysubstance abuse, including tobacco, alcohol.      Hospital Course:   Carlos Molina is a 56 y.o. male who presented to the ER with chief complaint of ongoing dizziness, slurred speech, bilateral upper extremity tingling, numbness for 2 days.    Off note patient does endorse having chronic neck pain secondary to trauma he suffered 10 years ago at his workplace, has surgical intervention done at baseline, who recently was seen by pain management doctor, had injections in his cervical spine on 08/05, patient since then endorses having dizziness when bending over issues to type 2.    Patient also mentioned having slurring of the speech, tingling and numbness in bilateral upper extremities for the past 2 days.    Other past medical history significant for for the left lower extremity sciatic pain, psychiatric illness including schizoaffective disorder.    Pt admitted for TIA, MRI brain neg for any acute stroke chronic microvascular ischemic changes noted, given recent c spine injections obtained MRI c-spine, no evidence of any infection/acute abnormality. TSH, A1c,lipid panel WNl HIV, syphilis neg.  US carotid with no flow limiting stenosis, echo no sunting or other abnormalities noted.  Pt worked with PT, symptoms improved over hospital course almost resolved, back to baseline hence decision was made to discharge home.  meds reconciled, refilled and discussed with pt.  Pt verbalizes curent plan, Pt to be discharged home after checking with neurology.   Pt was seen and examined  on the day of discharge  Vitals:  VITAL SIGNS: 24 HRS MIN & MAX LAST   Temp  Min: 98.4 °F (36.9 °C)  Max: 98.4 °F (36.9 °C) 98.4 °F (36.9 °C)   BP  Min: 101/70  Max: 143/95 122/87   Pulse  Min: 64  Max: 89  72   Resp  Min: 16  Max: 19 16   SpO2  Min: 95 %  Max: 100 % 95 %       Physical Exam:  General: NAD   HEENT: Atraumatic, Normocephalic. No scleral icterus.   Neck: supple. No JVD   Pulm: CTAB. No wheezes. No crackles. Symmetrical chest expansion.  Cardio: Regular rate. Regular rhythm. No murmurs/gallops.   Abd: +BS, soft, non-distended, non-tender to palpation.   Extremities: good 2+ pulses in all extremities. No LE edema.   MSK: No obvious deformities. Moves all extremities purposely.   Neuro: Alert, responsive. Oriented x 3. Responds to questions appropriately. Follows simple commands,strength 5/5 in all extremities, sensation intact to light touch, reflexes 2+, no cerebellar deficits noted.       Procedures Performed: No admission procedures for hospital encounter.     Significant Diagnostic Studies: See Full reports for all details    Recent Labs   Lab 08/15/22  2011 08/16/22  1755 08/17/22  0805   WBC 8.0 8.3 7.1   RBC 5.50 5.36 5.23   HGB 15.5 14.9 14.6   HCT 46.6 47.2 46.4   MCV 84.7 88.1 88.7   MCH 28.2 27.8 27.9   MCHC 33.3 31.6* 31.5*   RDW 13.3 13.9 14.2    195 181   MPV 9.4 10.3 9.8       Recent Labs   Lab 08/15/22  2011 08/16/22  1755 08/17/22  0805    142 142   K 4.6 4.6 3.9   CO2 25 26 29   BUN 6.9* 8.4 6.1*   CREATININE 1.16 1.09 1.06   CALCIUM 9.8 9.8 9.5   MG 2.00  --  2.00   ALBUMIN 4.4 4.3 4.0   ALKPHOS 52 59 54   ALT 14 16 16   AST 21 19 21   BILITOT 0.5 0.3 0.4        Microbiology Results (last 7 days)     ** No results found for the last 168 hours. **           MRI Cervical Spine W WO Cont  Narrative: EXAMINATION:  MRI CERVICAL SPINE W WO CONTRAST    CLINICAL HISTORY:  Cervical radiculopathy, infection suspected;    TECHNIQUE:  Multiplanar, multisequence MR imaging of the  cervical spine with and without contrast.    COMPARISON:  Cervical spine x-rays dated 12/15/2020    FINDINGS:  There are postoperative changes with anterior fusion hardware at C4-C5 and C6-C7.  There is reversal of normal cervical lordosis with minimal retrolisthesis of C3 over C4.  There is a rounded defect along the superior endplate of T1 with enhancement and edema, suggestive of a Schmorl's node, and mild loss of vertebral body height.  There is no associated disc edema or enhancement.  The remaining vertebral heights are preserved.  The bone marrow is otherwise normal in signal.    There is no cord signal abnormality.  There is no abnormal intrathecal or epidural enhancement.  There is no epidural fluid collection.    The paraspinal soft tissues are unremarkable.  There is no drainable fluid collection.    Disc level analysis as follows:    C2-C3: No disc herniation.  No significant spinal canal or neural foraminal stenosis.    C3-C4: Minimal retrolisthesis of C3 over C4 with disc height loss, posterior disc osteophyte complex and facet hypertrophy.  Mild narrowing the spinal canal with mild right and moderate left neural foraminal stenosis.    C4-C5: Postoperative changes.  No significant spinal canal or neural foraminal stenosis.    C5-C6: Disc height loss with posterior disc osteophyte complex asymmetric on the left and mild narrowing of the spinal canal.  Mild bilateral neural foraminal stenosis secondary to uncovertebral and facet hypertrophy.    C6-C7: Postoperative changes.  No significant spinal canal stenosis.  Moderate left neural foraminal stenosis secondary to uncovertebral and facet hypertrophy.    C7-T1: No disc herniation.  No significant spinal canal or neural foraminal stenosis.  Impression: 1. Suspected Schmorl's node along the superior endplate of T1 with reactive edema and enhancement and mild loss of vertebral body height.  Otherwise no definite evidence to suggest discitis/osteomyelitis.   No drainable fluid collection.  2. Mild degenerative narrowing of the spinal canal at C3-C4 and C5-C6.  3. Multilevel neural foraminal stenoses as described.  4. No cord signal abnormality.    Electronically signed by: Rosita Cortes  Date:    08/17/2022  Time:    12:25  MRI Brain Without Contrast  Narrative: EXAMINATION:  MRI BRAIN WITHOUT CONTRAST    CLINICAL HISTORY:  headache, dizziness, slurred speech;    TECHNIQUE:  Multiplanar MRI sequences were performed of the brain without contrast.    COMPARISON:  CT brain without contrast August 17, 2022    FINDINGS:  Bilateral cerebral periventricular and subcortical white matter variable size T2-FLAIR hyperintense signals are consistent with mild chronic microangiopathic ischemia.  Gradient echo sequences demonstrate no evidence of de phasing artifact to suggest hemorrhagic byproducts. No evidence of diffusion restriction or ADC map signal drop out to suggest acute infarct. The sella and suprasellar areas are unremarkable.    The cerebellar tonsils are normally positioned. There is no acute intracranial hemorrhage, hydrocephalus, midline shift or mass effect. No acute extra axial fluid collections identified. The mastoid air cells are clear.  Impression: 1.  No acute intracranial findings identified.    2.  Mild chronic microangiopathic ischemia.    No significant discrepancy with overnight report    Electronically signed by: Mauri Alba  Date:    08/17/2022  Time:    10:14  CT Head Without Contrast  Narrative: EXAMINATION:  CT HEAD WITHOUT CONTRAST    CLINICAL HISTORY:  headache, dizziness, slurred speech;    TECHNIQUE:  Low dose axial images were obtained through the head.  Coronal and sagittal reformations were also performed. Contrast was not administered.    Automatic exposure control was utilized to reduce the patient's radiation dose.    DLP= 919    COMPARISON:  08/15/2022    FINDINGS:  No acute intracranial hemorrhage, edema or mass. No acute parenchymal  abnormality.    Mild cerebral atrophy with concordant ventricular enlargement.    There is normal gray white differentiation.    The osseous structures are normal.    The mastoid air cells are clear.    The auditory canals are patent bilaterally.    The globes and orbital contents are normal bilaterally.    The visualized maxillary, ethmoid and sphenoid sinuses are clear.  Impression: No acute intracranial abnormality identified.  Findings of mild microvascular ischemic disease.    Electronically signed by: Jose Antonio Call  Date:    08/17/2022  Time:    07:10         Medication List      START taking these medications    aspirin 81 MG Chew  Take 1 tablet (81 mg total) by mouth once daily.  Start taking on: August 18, 2022     clopidogreL 75 mg tablet  Commonly known as: PLAVIX  Take 1 tablet (75 mg total) by mouth once daily. for 21 days  Start taking on: August 18, 2022        CONTINUE taking these medications    amoxicillin 500 MG capsule  Commonly known as: AMOXIL     benztropine 1 MG tablet  Commonly known as: COGENTIN     buPROPion 150 MG TB24 tablet  Commonly known as: WELLBUTRIN XL     busPIRone 30 MG Tab  Commonly known as: BUSPAR     divalproex 500 MG Tb24     INVEGA TRINZA 546 mg/1.75 mL Syrg  Generic drug: paliperidone palm (3-month)     mirtazapine 30 MG tablet  Commonly known as: REMERON     naltrexone 50 mg tablet  Commonly known as: DEPADE     paliperidone 3 MG Tr24  Commonly known as: INVEGA     pravastatin 20 MG tablet  Commonly known as: PRAVACHOL           Where to Get Your Medications      These medications were sent to [id number] - [address] AT [facility] [address] [address]    Phone: [phone]   · aspirin 81 MG Chew  · clopidogreL 75 mg tablet          Explained in detail to the patient about the discharge plan, medications, and follow-up visits. Pt understands and agrees with the treatment plan  Discharge Disposition:  Home or Self Care   Discharged Condition: stable  Diet-   Dietary Orders (From admission, onward)     Start     Ordered    08/17/22 0644  Diet Adult Regular  (Diet/Nutrition [facility])  Diet effective now         08/17/22 0645               Medications Per DC med rec  Activities as tolerated   Follow-up Information     Suman Damon MD Follow up in 2 week(s).    Specialty: Family Medicine  Contact information:  [facility] [facility]  [address]  [facility] [address]  [phone]                       For further questions contact hospitalist office    Discharge time 33 minutes    For worsening symptoms, chest pain, shortness of breath, increased abdominal pain, high grade fever, stroke or stroke like symptoms, immediately go to the nearest Emergency Room or call 911 as soon as possible.    Amber Reddy MD  [employer] IM Resident PGY-3      I Dr ALESIA Simeon assumed care of this patient today     For this patient encounter I performed the substantive portion of the visit. I reviewed the residents documentation, treatment plan and medical decision making; and I had face-face time with this patient   A. History  B.Physical exam   C. Medical decision making   Agree with the residents documentation and discharge plan

## 2022-08-17 NOTE — H&P
[facility] Medicine History & Physical Examination       Patient Name: Carlos Molina  MRN: [id number]  Patient Class: IP- Inpatient   Admission Date: 8/16/2022   Admitting Physician: KEYONA Service   Length of Stay: 1  Attending Physician: Amber Reddy MD  Primary Care Provider: Suman Damon MD  Face-to-Face encounter date: 08/17/2022  Code Status:Full  Chief Complaint: Speech Problem (Pt wife reports 2 days history of slurred speech. Seen at [facility] 08/15 for similar complaint. Advised if symptoms did not improved to come to [facility] for eval and further workup.)          HISTORY OF PRESENT ILLNESS:   Carlos Molina is a 56 y.o. male who presented to the ER with chief complaint of ongoing dizziness, slurred speech, bilateral upper extremity tingling, numbness for 2 days.    Off note patient does endorse having chronic neck pain secondary to trauma he suffered 10 years ago at his workplace, has surgical intervention done at baseline, who recently was seen by pain management doctor, had injections in his cervical spine on 08/05, patient since then endorses having dizziness when bending over issues to type 2.    Patient also mentioned having slurring of the speech, tingling and numbness in bilateral upper extremities for the past 2 days.    Other past medical history significant for for the left lower extremity sciatic pain, psychiatric illness including schizoaffective disorder.      PAST MEDICAL HISTORY:     Past Medical History:   Diagnosis Date    Lumbago with sciatica     Schizoaffective disorder        PAST SURGICAL HISTORY:   History reviewed. No pertinent surgical history.    ALLERGIES:   Patient has no known allergies.    FAMILY HISTORY:   Significant history of hypertension, diabetes    SOCIAL HISTORY:     Social History     Tobacco Use    Smoking status: Current Some Day Smoker    Smokeless tobacco: Never Used   Substance Use Topics    Alcohol use: Not  on file    does endorse smoking to take a cigarettes a day.    Does drink 2-3 beers a day, quit around the in the ago.    Denies any recreational substance use    HOME MEDICATIONS:     Prior to Admission medications    Medication Sig Start Date End Date Taking? Authorizing Provider   amoxicillin (AMOXIL) 500 MG capsule Take 500 mg by mouth 3 (three) times daily. 22   Historical Provider   benztropine (COGENTIN) 1 MG tablet Take 1 mg by mouth once daily. 22   Historical Provider   buPROPion (WELLBUTRIN XL) 150 MG TB24 tablet Take 150 mg by mouth every morning. 22   Historical Provider   busPIRone (BUSPAR) 30 MG Tab Take 30 mg by mouth 2 (two) times daily. 22   Historical Provider   divalproex ER (DEPAKOTE) 500 MG Tb24 Take 500 mg by mouth nightly. 22   Historical Provider   INVEGA TRINZA 546 mg/1.75 mL Syrg Inject 1 Syringe into the muscle every 3 (three) months. 22   Historical Provider   mirtazapine (REMERON) 30 MG tablet Take 30 mg by mouth nightly. 22   Historical Provider   naltrexone (DEPADE) 50 mg tablet Take 25 mg by mouth once daily. 22   Historical Provider   paliperidone (INVEGA) 3 MG TR24 Take 3 mg by mouth once daily. 22   Historical Provider   pravastatin (PRAVACHOL) 20 MG tablet SMARTSI Tablet(s) By Mouth Every Evening 22   Historical Provider       REVIEW OF SYSTEMS:   General: no fever, no night sweats, no chills, no generalized fatigue, no changes in weight.  Skin: no rashes, bruises, petechia  HEENT: no headache, head injury, no acute vision changes.  CVS: no chest pain, palpitations, orthopnea, PND, edema  Resp: no SOB, cough, wheezing  GI: no dysphagia, melena, hematochezia, abdominal pain, nausea, vomiting, constipation, diarrhea.  : no dysuria, hematuria, polyuria, suprapubic or CVA pain, nocturia  Musculoskeletal: no joint stiffness, pain, swelling or weakness  Neuro:+ headaches, syncope, seizures, +numbness, +tingling, vertigo,  +dizziness      PHYSICAL EXAM:     VITAL SIGNS: 24 HRS MIN & MAX LAST   Temp  Min: 98.4 °F (36.9 °C)  Max: 98.4 °F (36.9 °C) 98.4 °F (36.9 °C)   BP  Min: 101/70  Max: 143/95 101/70   Pulse  Min: 64  Max: 89  67   Resp  Min: 16  Max: 19 17   SpO2  Min: 95 %  Max: 100 % 96 %       General appearance: Well-developed, well-nourished male in no apparent distress.  HENT: Atraumatic head. Moist mucous membranes of oral cavity.  Eyes: Normal extraocular movements.   Neck: Supple.   Lungs: Clear to auscultation bilaterally. No wheezing present.   Heart: Regular rate and rhythm. S1 and S2 present with no murmurs/gallop/rub. No pedal edema. No JVD present.   Abdomen: Soft, non-distended, non-tender. No rebound tenderness/guarding. Bowel sounds are normal.   Extremities: No cyanosis, clubbing, or edema.  Skin: No Rash.   Neuro:  AAO x4, no focal neurological deficits, strength 5/5 in all extremities, sensation intact to light touch, no cerebellar deficits identified.     Psych/mental status: Appropriate mood and affect. Responds appropriately to questions.     LABS AND IMAGING:     Recent Labs   Lab 08/15/22  2011 08/16/22  1755 08/17/22  0805   WBC 8.0 8.3 7.1   RBC 5.50 5.36 5.23   HGB 15.5 14.9 14.6   HCT 46.6 47.2 46.4   MCV 84.7 88.1 88.7   MCH 28.2 27.8 27.9   MCHC 33.3 31.6* 31.5*   RDW 13.3 13.9 14.2    195 181   MPV 9.4 10.3 9.8       Recent Labs   Lab 08/15/22  2011 08/16/22  1755 08/17/22  0805    142 142   K 4.6 4.6 3.9   CO2 25 26 29   BUN 6.9* 8.4 6.1*   CREATININE 1.16 1.09 1.06   CALCIUM 9.8 9.8 9.5   MG 2.00  --  2.00   ALBUMIN 4.4 4.3 4.0   ALKPHOS 52 59 54   ALT 14 16 16   AST 21 19 21   BILITOT 0.5 0.3 0.4       Microbiology Results (last 7 days)     ** No results found for the last 168 hours. **           MRI Brain Without Contrast  Narrative: EXAMINATION:  MRI BRAIN WITHOUT CONTRAST    CLINICAL HISTORY:  headache, dizziness, slurred speech;    TECHNIQUE:  Multiplanar MRI sequences were  performed of the brain without contrast.    COMPARISON:  CT brain without contrast August 17, 2022    FINDINGS:  Bilateral cerebral periventricular and subcortical white matter variable size T2-FLAIR hyperintense signals are consistent with mild chronic microangiopathic ischemia.  Gradient echo sequences demonstrate no evidence of de phasing artifact to suggest hemorrhagic byproducts. No evidence of diffusion restriction or ADC map signal drop out to suggest acute infarct. The sella and suprasellar areas are unremarkable.    The cerebellar tonsils are normally positioned. There is no acute intracranial hemorrhage, hydrocephalus, midline shift or mass effect. No acute extra axial fluid collections identified. The mastoid air cells are clear.  Impression: 1.  No acute intracranial findings identified.    2.  Mild chronic microangiopathic ischemia.    No significant discrepancy with overnight report    Electronically signed by: Mauri Alba  Date:    08/17/2022  Time:    10:14  CT Head Without Contrast  Narrative: EXAMINATION:  CT HEAD WITHOUT CONTRAST    CLINICAL HISTORY:  headache, dizziness, slurred speech;    TECHNIQUE:  Low dose axial images were obtained through the head.  Coronal and sagittal reformations were also performed. Contrast was not administered.    Automatic exposure control was utilized to reduce the patient's radiation dose.    DLP= 919    COMPARISON:  08/15/2022    FINDINGS:  No acute intracranial hemorrhage, edema or mass. No acute parenchymal abnormality.    Mild cerebral atrophy with concordant ventricular enlargement.    There is normal gray white differentiation.    The osseous structures are normal.    The mastoid air cells are clear.    The auditory canals are patent bilaterally.    The globes and orbital contents are normal bilaterally.    The visualized maxillary, ethmoid and sphenoid sinuses are clear.  Impression: No acute intracranial abnormality identified.  Findings of mild  microvascular ischemic disease.    Electronically signed by: Jose Antonio Call  Date:    08/17/2022  Time:    07:10        ASSESSMENT & PLAN:   TIA.    - Symptoms started approximately 2 days prior to arrival  - CT head:  No evidence of hemorrhage/ischemic stroke noted  - Carotidultrasound ordered  - MRI brain without contrast with no evidence of acute stroke  - Echo with bubble study ordered  - HgBA1c, lipid panel, TSH, B12, Folate, RPR and HIV ordered  - Swallow study ordered  - SLP, PT/OT consult placed  - Atorvastatin 40 mg daily  - Neurochecks q1hrs  - Asprin: Holding until 24 hr after tPA.  - neurology consulted, appreciate further recommendations, patient started on DAPT        Chronic pain secondary to trauma several years ago, on chronic pain meds.    Schizoaffective disorder, on multiple psych meds at home.    Polysubstance abuse, including tobacco, alcohol.      Holding psych meds currently utilizing a, will restart as tolerated  Educated on importance of quitting tobacco, alcohol, patient verbalized understanding    VTE Prophylaxis:  Lovenox 40 mg daily       Patient condition:  Stable/Fair/Gaurded/ Serious/ Critical    __________________________________________________________________________  INPATIENT LIST OF MEDICATIONS     Scheduled Meds:   aspirin  81 mg Oral Daily    atorvastatin  40 mg Oral Daily    clopidogreL  75 mg Oral Daily       Amber Reddy MD  [facility] IM Resident PGY-3    I Dr ALESIA Simeon assumed care of this patient today     For this patient encounter I performed the substantive portion of the visit. I reviewed the Residents documentation, treatment plan and medical decision making; and I had face-face time with this patient   A. History  Patient came in with dizziness, slurred speech and dustin UE tingling and numbness for 2 days   Currently asymptomatic  Denies any SOB, chest pain. Weakness, fever or chills  He had recent injections in his cervical spine    B.Physical exam   Patient  awake and comfortable  Ambulating well.   Heart RRR  Lungs clear   Abdomen soft and non tender  No FND    C. Medical decision making   Patients labs, vitals and MRI brain and Cspine reviewed  He is now stable and asymptomatic  Ambulating and eating well  No neurological deficits   Can dc to home if cleared  neuro team     Agree with the residents documentation and tx plan

## 2022-08-17 NOTE — PT/OT/SLP EVAL
"Speech Language Pathology Department  Clinical Swallow Evaluation    Patient Name:  Carlos Molina   MRN:  [id number]  Admitting Diagnosis: Speech disturbance    Recommendations:     General Recommendations:  Speech/Language and Cognitive Evaluation  Diet recommendations:  Regular Diet - IDDSI Level 7, Liquid Diet Level: Thin liquids - IDDSI Level 0   Swallow Strategies/Precautions: n/a  General Precautions: Standard,      History:     Past Medical History:   Diagnosis Date    Lumbago with sciatica     Schizoaffective disorder        History reviewed. No pertinent surgical history.      Home Diet: Regular and thin liquids  Current Method of Nutrition: NPO    Patient complaint: "I want to get better."    Subjective     Patient awake, alert and oriented x4.    Patient goals: "I want to get better."     Pain/Comfort: Pain Rating 1: 0/10    Respiratory Status: room air    Objective:     Oral Musculature Evaluation  · Oral Musculature: WFL  · Dentition: present and adequate  · Oral Labial Strength and Mobility: WFL  · Lingual Strength and Mobility: WFL  · Voice Prior to PO Intake: adequate    Consistency Fed By Oral Symptoms Pharyngeal Symptoms   Thin liquids via cup Self fed none none   Regular solids self fed none none   Thin liquids via straw Self fed none none                               Assessment:     The pt presents with oropharyngeal swallowing WFL.  Skilled SLP services not warranted to tx dysphagia.    Goals:   Multidisciplinary Problems     SLP Goals     Not on file                Plan:     Patient to be seen:      Plan of Care expires:     Plan of Care reviewed with:  patient, spouse   SLP Follow-Up:  No      Time Tracking:     SLP Treatment Date:   08/17/22  Speech Start Time:  1130  Speech Stop Time:  1145     Speech Total Time (min):  15 min    Billable minutes:  Swallow and Oral Function Evaluation, 15 minutes     08/17/2022             "

## 2022-08-18 LAB
LEFT CCA DIST DIAS: 24 CM/S
LEFT CCA DIST SYS: 74 CM/S
LEFT CCA PROX DIAS: 23 CM/S
LEFT CCA PROX SYS: 91 CM/S
LEFT ECA DIAS: 19 CM/S
LEFT ECA SYS: 79 CM/S
LEFT ICA DIST DIAS: 31 CM/S
LEFT ICA DIST SYS: 82 CM/S
LEFT ICA MID DIAS: 25 CM/S
LEFT ICA MID SYS: 74 CM/S
LEFT ICA PROX DIAS: 19 CM/S
LEFT ICA PROX SYS: 88 CM/S
LEFT VERTEBRAL DIAS: 8 CM/S
LEFT VERTEBRAL SYS: 23 CM/S
OHS CV CAROTID RIGHT ICA EDV HIGHEST: 27
OHS CV CAROTID ULTRASOUND LEFT ICA/CCA RATIO: 1.19
OHS CV CAROTID ULTRASOUND RIGHT ICA/CCA RATIO: 0.86
OHS CV PV CAROTID LEFT HIGHEST CCA: 91
OHS CV PV CAROTID LEFT HIGHEST ICA: 88
OHS CV PV CAROTID RIGHT HIGHEST CCA: 105
OHS CV PV CAROTID RIGHT HIGHEST ICA: 90
OHS CV US CAROTID LEFT HIGHEST EDV: 31
RIGHT CCA DIST DIAS: 32 CM/S
RIGHT CCA DIST SYS: 105 CM/S
RIGHT CCA PROX DIAS: 24 CM/S
RIGHT CCA PROX SYS: 98 CM/S
RIGHT ECA DIAS: 15 CM/S
RIGHT ECA SYS: 71 CM/S
RIGHT ICA DIST DIAS: 27 CM/S
RIGHT ICA DIST SYS: 90 CM/S
RIGHT ICA MID DIAS: 20 CM/S
RIGHT ICA MID SYS: 40 CM/S
RIGHT ICA PROX DIAS: 26 CM/S
RIGHT ICA PROX SYS: 76 CM/S
RIGHT VERTEBRAL DIAS: 17 CM/S
RIGHT VERTEBRAL SYS: 45 CM/S

## 2022-08-22 NOTE — CLINICAL REVIEW
56 year old male admitted on 8/16/22 and discharged no 8/17/22.  Diagnosed with a TIA.  There was no documentation of hemodynamic instability, recurrence of focal neurological signs, encephalopathy, cardiac arrhythmias of immediate concern, severe hypertension, suspected vasculitis.  The patient could have been treated under an observation level of care    Prema Aguero MD  Utilization Management  Physician Advisor

## 2022-08-24 ENCOUNTER — PATIENT OUTREACH (OUTPATIENT)
Dept: ADMINISTRATIVE | Facility: CLINIC | Age: 57
End: 2022-08-24
Payer: MEDICAID

## 2022-09-13 ENCOUNTER — HOSPITAL ENCOUNTER (EMERGENCY)
Facility: HOSPITAL | Age: 57
Discharge: HOME | End: 2022-09-13
Attending: EMERGENCY MEDICINE
Payer: COMMERCIAL

## 2022-09-13 VITALS
OXYGEN SATURATION: 99 % | WEIGHT: 183 LBS | RESPIRATION RATE: 17 BRPM | HEART RATE: 93 BPM | HEIGHT: 71 IN | BODY MASS INDEX: 25.62 KG/M2 | SYSTOLIC BLOOD PRESSURE: 108 MMHG | DIASTOLIC BLOOD PRESSURE: 65 MMHG | TEMPERATURE: 97 F

## 2022-09-13 DIAGNOSIS — G89.29 CHRONIC LEFT-SIDED LOW BACK PAIN WITH LEFT-SIDED SCIATICA: Primary | ICD-10-CM

## 2022-09-13 DIAGNOSIS — M54.42 CHRONIC LEFT-SIDED LOW BACK PAIN WITH LEFT-SIDED SCIATICA: Primary | ICD-10-CM

## 2022-09-13 LAB
APPEARANCE UR: CLEAR
BILIRUB UR QL STRIP.AUTO: NEGATIVE MG/DL
COLOR UR AUTO: ABNORMAL
GLUCOSE UR QL STRIP.AUTO: NEGATIVE MG/DL
KETONES UR QL STRIP.AUTO: NEGATIVE MG/DL
LEUKOCYTE ESTERASE UR QL STRIP.AUTO: NEGATIVE UNIT/L
NITRITE UR QL STRIP.AUTO: NEGATIVE
PH UR STRIP.AUTO: 7 [PH]
PROT UR QL STRIP.AUTO: NEGATIVE MG/DL
RBC UR QL AUTO: NEGATIVE UNIT/L
SP GR UR STRIP.AUTO: 1.01
UROBILINOGEN UR STRIP-ACNC: 0.2 MG/DL

## 2022-09-13 PROCEDURE — 96372 THER/PROPH/DIAG INJ SC/IM: CPT | Performed by: EMERGENCY MEDICINE

## 2022-09-13 PROCEDURE — 81003 URINALYSIS AUTO W/O SCOPE: CPT | Performed by: EMERGENCY MEDICINE

## 2022-09-13 PROCEDURE — 99285 EMERGENCY DEPT VISIT HI MDM: CPT | Mod: 25

## 2022-09-13 PROCEDURE — 63600175 PHARM REV CODE 636 W HCPCS: Performed by: EMERGENCY MEDICINE

## 2022-09-13 RX ORDER — HYDROCODONE BITARTRATE AND ACETAMINOPHEN 10; 325 MG/1; MG/1
1 TABLET ORAL EVERY 6 HOURS PRN
COMMUNITY
Start: 2022-09-07

## 2022-09-13 RX ORDER — HYDROMORPHONE HYDROCHLORIDE 2 MG/ML
1 INJECTION, SOLUTION INTRAMUSCULAR; INTRAVENOUS; SUBCUTANEOUS
Status: COMPLETED | OUTPATIENT
Start: 2022-09-13 | End: 2022-09-13

## 2022-09-13 RX ORDER — BETAMETHASONE SODIUM PHOSPHATE AND BETAMETHASONE ACETATE 3; 3 MG/ML; MG/ML
6 INJECTION, SUSPENSION INTRA-ARTICULAR; INTRALESIONAL; INTRAMUSCULAR; SOFT TISSUE
Status: COMPLETED | OUTPATIENT
Start: 2022-09-13 | End: 2022-09-13

## 2022-09-13 RX ORDER — CYPROHEPTADINE HYDROCHLORIDE 4 MG/1
TABLET ORAL
COMMUNITY
Start: 2022-09-06

## 2022-09-13 RX ORDER — KETOROLAC TROMETHAMINE 30 MG/ML
60 INJECTION, SOLUTION INTRAMUSCULAR; INTRAVENOUS
Status: COMPLETED | OUTPATIENT
Start: 2022-09-13 | End: 2022-09-13

## 2022-09-13 RX ORDER — GABAPENTIN 300 MG/1
300 CAPSULE ORAL 2 TIMES DAILY
COMMUNITY
Start: 2022-09-06

## 2022-09-13 RX ORDER — IBUPROFEN 800 MG/1
800 TABLET ORAL EVERY 8 HOURS PRN
COMMUNITY
Start: 2022-09-06

## 2022-09-13 RX ADMIN — BETAMETHASONE ACETATE AND BETAMETHASONE SODIUM PHOSPHATE 6 MG: 3; 3 INJECTION, SUSPENSION INTRA-ARTICULAR; INTRALESIONAL; INTRAMUSCULAR; SOFT TISSUE at 09:09

## 2022-09-13 RX ADMIN — KETOROLAC TROMETHAMINE 60 MG: 30 INJECTION, SOLUTION INTRAMUSCULAR; INTRAVENOUS at 09:09

## 2022-09-13 RX ADMIN — HYDROMORPHONE HYDROCHLORIDE 1 MG: 2 INJECTION, SOLUTION INTRAMUSCULAR; INTRAVENOUS; SUBCUTANEOUS at 09:09

## 2022-09-13 NOTE — ED PROVIDER NOTES
Encounter Date: 9/13/2022       History     Chief Complaint   Patient presents with    Back Pain     Pt to er c/o low backpain onset two days ago, states pain radiates down left leg.     Patient is a 55 yo M presenting with back pain. This feels fairly typical of his chronic sciatica with a flare up in the past two days. Starts in lumbar region with radiation to the left flank to buttock and into the posterior aspect of the leg. No new falls. No fevers, chills, chest pain, shortness of breath, nausea, vomiting, leg swelling, saddle anesthesia, urinary/stool incontinence, or new weakness. His wife does report some difficulty urinating without incontinence. He has bene taking his home pain medication without relief. He denies recent stroke, hx of heart disease/stents, GI bleeding, or diabetes.    Review of patient's allergies indicates:  No Known Allergies  Past Medical History:   Diagnosis Date    Lumbago with sciatica     Schizoaffective disorder      No past surgical history on file.  No family history on file.  Social History     Tobacco Use    Smoking status: Some Days    Smokeless tobacco: Never   Substance Use Topics    Drug use: Never     Review of Systems   All other systems reviewed and are negative.    Physical Exam     Initial Vitals [09/13/22 0859]   BP Pulse Resp Temp SpO2   108/65 93 18 96.8 °F (36 °C) 99 %      MAP       --         Physical Exam    Nursing note and vitals reviewed.  Constitutional: He appears well-developed and well-nourished. He is not diaphoretic. No distress.   HENT:   Head: Normocephalic and atraumatic.   Eyes: Conjunctivae are normal.   Neck: Neck supple.   Cardiovascular:  Normal rate, regular rhythm and normal heart sounds.           Pulmonary/Chest: Breath sounds normal. No respiratory distress. He has no wheezes. He has no rhonchi.   Abdominal: Abdomen is soft. Bowel sounds are normal. He exhibits no distension. There is no abdominal tenderness. There is no rebound and no  guarding.   Musculoskeletal:         General: No edema.      Cervical back: Neck supple.      Comments: LLE is neurovascularly intact. Cap refill 2 sec. There is midline L spine ttp at approx L3-4 with L paraspinal ttp. + straight leg raise test on the left.      Neurological: He is alert and oriented to person, place, and time.   Skin: Skin is warm and dry.   Psychiatric: He has a normal mood and affect. Thought content normal.       ED Course   Procedures  Labs Reviewed   URINALYSIS, REFLEX TO URINE CULTURE - Abnormal; Notable for the following components:       Result Value    Color, UA Straw (*)     All other components within normal limits          Imaging Results              CT Lumbar Spine Without Contrast (Final result)  Result time 09/13/22 09:47:10      Final result by Rosita Cortes MD (09/13/22 09:47:10)                   Impression:      1. No acute abnormality identified.  2. Mild degenerative changes of the lumbar spine.      Electronically signed by: Rosita Cortes  Date:    09/13/2022  Time:    09:47               Narrative:    EXAMINATION:  CT LUMBAR SPINE WITHOUT CONTRAST    CLINICAL HISTORY:  Lumbar radiculopathy, symptoms persist with conservative treatment;    TECHNIQUE:  Noncontrast CT images of the lumbar spine. Axial, coronal, and sagittal reformatted images were obtained. Dose length product is 1381 mGycm. Automatic exposure control, adjustment of mA/kV or iterative reconstruction technique was used to limit radiation dose.    COMPARISON:  X-rays dated 10/30/2021    FINDINGS:  The lowest fully formed disc space is labeled as L5-S1 with hypoplastic ribs at L1.  Alignment is preserved without subluxation.  Vertebral heights are maintained.  There are Schmorl's node along the inferior endplate of L4.  There is no acute fracture identified.    There are mild degenerative changes with small marginal osteophytes and facet arthropathy.  There is no evidence of a large disc herniation.   There is mild neural foraminal narrowing bilaterally at L5-S1.    There is no paraspinal hematoma.                                       Medications   ketorolac injection 60 mg (60 mg Intramuscular Given 9/13/22 0932)   HYDROmorphone (PF) injection 1 mg (1 mg Intramuscular Given 9/13/22 0932)   betamethasone acetate-betamethasone sodium phosphate injection 6 mg (6 mg Intramuscular Given 9/13/22 0931)     Medical Decision Making:   Reviewed . Patient has 42 narcotic pills at home from his prescription. He is already on Ibuprofen. With his psyciatric medication, risk of interaction with flexeril. Will hold off on further medications at this time.                         Clinical Impression:   Final diagnoses:  [M54.42, G89.29] Chronic left-sided low back pain with left-sided sciatica (Primary)      ED Disposition Condition    Discharge Stable          ED Prescriptions    None       Follow-up Information       Follow up With Specialties Details Why Contact Info    Suman Damon MD Family Medicine In 1 day If symptoms worsen, return to the ED [address] [facility] [address]  [facility] [address]  [phone]               Josee Peña MD  09/19/22 [id number]

## 2022-11-21 PROBLEM — G45.9 TIA (TRANSIENT ISCHEMIC ATTACK): Status: RESOLVED | Noted: 2022-08-17 | Resolved: 2022-11-21

## 2022-12-14 ENCOUNTER — HOSPITAL ENCOUNTER (EMERGENCY)
Facility: HOSPITAL | Age: 57
Discharge: HOME | End: 2022-12-14
Attending: EMERGENCY MEDICINE
Payer: COMMERCIAL

## 2022-12-14 VITALS
DIASTOLIC BLOOD PRESSURE: 107 MMHG | OXYGEN SATURATION: 96 % | TEMPERATURE: 98 F | HEART RATE: 102 BPM | SYSTOLIC BLOOD PRESSURE: 147 MMHG | RESPIRATION RATE: 18 BRPM

## 2022-12-14 DIAGNOSIS — R55 NEAR SYNCOPE: Primary | ICD-10-CM

## 2022-12-14 DIAGNOSIS — R42 DIZZINESS: ICD-10-CM

## 2022-12-14 LAB
ALBUMIN SERPL-MCNC: 4.3 G/DL (ref 3.5–5)
ALBUMIN/GLOB SERPL: 1.4 RATIO (ref 1.1–2)
ALP SERPL-CCNC: 59 UNIT/L (ref 40–150)
ALT SERPL-CCNC: 12 UNIT/L (ref 0–55)
APPEARANCE UR: CLEAR
AST SERPL-CCNC: 21 UNIT/L (ref 5–34)
BACTERIA #/AREA URNS AUTO: NORMAL /HPF
BASOPHILS # BLD AUTO: 0.02 X10(3)/MCL (ref 0–0.2)
BASOPHILS NFR BLD AUTO: 0.3 %
BILIRUB UR QL STRIP.AUTO: NEGATIVE MG/DL
BILIRUBIN DIRECT+TOT PNL SERPL-MCNC: 0.6 MG/DL
BUN SERPL-MCNC: 5.9 MG/DL (ref 8.4–25.7)
CALCIUM SERPL-MCNC: 9.8 MG/DL (ref 8.4–10.2)
CHLORIDE SERPL-SCNC: 108 MMOL/L (ref 98–107)
CO2 SERPL-SCNC: 24 MMOL/L (ref 22–29)
COLOR UR AUTO: YELLOW
CREAT SERPL-MCNC: 1.1 MG/DL (ref 0.73–1.18)
EOSINOPHIL # BLD AUTO: 0.04 X10(3)/MCL (ref 0–0.9)
EOSINOPHIL NFR BLD AUTO: 0.5 %
ERYTHROCYTE [DISTWIDTH] IN BLOOD BY AUTOMATED COUNT: 14.5 % (ref 11.6–14.4)
GFR SERPLBLD CREATININE-BSD FMLA CKD-EPI: >60 MLS/MIN/1.73/M2
GLOBULIN SER-MCNC: 3.1 GM/DL (ref 2.4–3.5)
GLUCOSE SERPL-MCNC: 109 MG/DL (ref 74–100)
GLUCOSE UR QL STRIP.AUTO: NEGATIVE MG/DL
HCT VFR BLD AUTO: 42.7 % (ref 42–52)
HGB BLD-MCNC: 13.7 GM/DL (ref 14–18)
IMM GRANULOCYTES # BLD AUTO: 0.02 X10(3)/MCL (ref 0–0.04)
IMM GRANULOCYTES NFR BLD AUTO: 0.3 %
INR BLD: 1.01 (ref 0–1.3)
KETONES UR QL STRIP.AUTO: NEGATIVE MG/DL
LEUKOCYTE ESTERASE UR QL STRIP.AUTO: NEGATIVE UNIT/L
LYMPHOCYTES # BLD AUTO: 2.41 X10(3)/MCL (ref 0.6–4.6)
LYMPHOCYTES NFR BLD AUTO: 32.2 %
MCH RBC QN AUTO: 27.2 PG
MCHC RBC AUTO-ENTMCNC: 32.1 MG/DL (ref 33–36)
MCV RBC AUTO: 84.9 FL (ref 80–94)
MONOCYTES # BLD AUTO: 0.41 X10(3)/MCL (ref 0.1–1.3)
MONOCYTES NFR BLD AUTO: 5.5 %
NEUTROPHILS # BLD AUTO: 4.58 X10(3)/MCL (ref 2.1–9.2)
NEUTROPHILS NFR BLD AUTO: 61.2 %
NITRITE UR QL STRIP.AUTO: NEGATIVE
NRBC BLD AUTO-RTO: 0 % (ref 0–1)
PH UR STRIP.AUTO: 7 [PH]
PLATELET # BLD AUTO: 194 X10(3)/MCL (ref 140–371)
PMV BLD AUTO: 9.1 FL (ref 9.4–12.4)
POTASSIUM SERPL-SCNC: 4 MMOL/L (ref 3.5–5.1)
PROT SERPL-MCNC: 7.4 GM/DL (ref 6.4–8.3)
PROT UR QL STRIP.AUTO: NEGATIVE MG/DL
PROTHROMBIN TIME: 13.2 SECONDS (ref 12.5–14.5)
RBC # BLD AUTO: 5.03 X10(6)/MCL (ref 4.7–6.1)
RBC #/AREA URNS AUTO: <5 /HPF
RBC UR QL AUTO: NEGATIVE UNIT/L
SODIUM SERPL-SCNC: 141 MMOL/L (ref 136–145)
SP GR UR STRIP.AUTO: 1 (ref 1–1.03)
SQUAMOUS #/AREA URNS AUTO: <5 /HPF
TROPONIN I SERPL-MCNC: <0.01 NG/ML (ref 0–0.04)
UROBILINOGEN UR STRIP-ACNC: 0.2 MG/DL
VALPROATE SERPL-MCNC: 36 UG/ML (ref 50–100)
WBC # SPEC AUTO: 7.5 X10(3)/MCL (ref 4.5–11.5)
WBC #/AREA URNS AUTO: <5 /HPF

## 2022-12-14 PROCEDURE — 81003 URINALYSIS AUTO W/O SCOPE: CPT | Performed by: NURSE PRACTITIONER

## 2022-12-14 PROCEDURE — 84484 ASSAY OF TROPONIN QUANT: CPT | Performed by: NURSE PRACTITIONER

## 2022-12-14 PROCEDURE — 81001 URINALYSIS AUTO W/SCOPE: CPT | Performed by: NURSE PRACTITIONER

## 2022-12-14 PROCEDURE — 99285 EMERGENCY DEPT VISIT HI MDM: CPT | Mod: 25

## 2022-12-14 PROCEDURE — 93005 ELECTROCARDIOGRAM TRACING: CPT

## 2022-12-14 PROCEDURE — 93010 EKG 12-LEAD: ICD-10-PCS | Mod: ,,, | Performed by: INTERNAL MEDICINE

## 2022-12-14 PROCEDURE — 93010 ELECTROCARDIOGRAM REPORT: CPT | Mod: ,,, | Performed by: INTERNAL MEDICINE

## 2022-12-14 PROCEDURE — 80053 COMPREHEN METABOLIC PANEL: CPT | Performed by: NURSE PRACTITIONER

## 2022-12-14 PROCEDURE — 85610 PROTHROMBIN TIME: CPT | Performed by: NURSE PRACTITIONER

## 2022-12-14 PROCEDURE — 80164 ASSAY DIPROPYLACETIC ACD TOT: CPT | Performed by: EMERGENCY MEDICINE

## 2022-12-14 PROCEDURE — 85025 COMPLETE CBC W/AUTO DIFF WBC: CPT | Performed by: NURSE PRACTITIONER

## 2022-12-14 NOTE — FIRST PROVIDER EVALUATION
Medical screening examination initiated.  I have conducted a focused provider triage encounter, findings are as follows:    Brief history of present illness:  Patient states slurred speech and dizziness starting yesterday.     There were no vitals filed for this visit.    Pertinent physical exam:  Awake, alert, ambulatory      Brief workup plan:  Labs, CT head, EKG    Preliminary workup initiated; this workup will be continued and followed by the physician or advanced practice provider that is assigned to the patient when roomed.

## 2022-12-14 NOTE — ED PROVIDER NOTES
"Encounter Date: 12/14/2022    SCRIBE #1 NOTE: I, Margo Olivera, am scribing for, and in the presence of,  Jonathan Franklin MD. I have scribed the following portions of the note - the EKG reading. Other sections scribed: HPI, ROS, Physical exam.     History     Chief Complaint   Patient presents with    Dizziness     Reports dizziness blurred vision and slurred speech beginning 1:30pm yesterday. Ambulatory in triage. [facility]       This is a 57 y.o. male, with PMHx of schizoaffective disorder, who presents with complaint of dizziness with onset 2 days ago. Patient reports that he does not feel as if he is falling over when he walks. He states that while driving yesterday, it went "blurry black" and he ended up in another hue. Patient adds that since the episode yesterday he has been experiencing speech difficulty. Patient reports weakness in both legs. Patient notes that he has chronic neck pain. He denies any fever, chills, and cough. Patient is compliant with all medications. Patient admits to smoking occasionally.     PCP is Suman Damon MD.      The history is provided by the patient.   Dizziness  This is a new problem. The current episode started 2 days ago. The problem has not changed since onset.Pertinent negatives include no chest pain, no abdominal pain and no shortness of breath. Nothing aggravates the symptoms. Nothing relieves the symptoms.   Review of patient's allergies indicates:  No Known Allergies  Past Medical History:   Diagnosis Date    Lumbago with sciatica     Schizoaffective disorder      History reviewed. No pertinent surgical history.  History reviewed. No pertinent family history.  Social History     Tobacco Use    Smoking status: Some Days    Smokeless tobacco: Never   Substance Use Topics    Drug use: Never     Review of Systems   Constitutional:  Negative for chills, fatigue, fever and unexpected weight change.   HENT:  Negative for congestion and rhinorrhea.    Eyes:  " Negative for pain.   Respiratory:  Negative for cough, chest tightness, shortness of breath and wheezing.    Cardiovascular:  Negative for chest pain.   Gastrointestinal:  Negative for abdominal pain, constipation, diarrhea, nausea and vomiting.   Genitourinary:  Negative for dysuria.   Musculoskeletal:  Positive for neck pain (Chronic). Negative for back pain.   Skin:  Negative for rash.   Allergic/Immunologic: Negative for environmental allergies, food allergies and immunocompromised state.   Neurological:  Positive for dizziness, speech difficulty and weakness.   Hematological:  Does not bruise/bleed easily.   Psychiatric/Behavioral:  Negative for sleep disturbance and suicidal ideas.      Physical Exam     Initial Vitals [12/14/22 1038]   BP Pulse Resp Temp SpO2   (!) 143/101 102 18 98.4 °F (36.9 °C) 96 %      MAP       --         Physical Exam    Nursing note and vitals reviewed.  Constitutional: He appears well-developed and well-nourished.   HENT:   Head: Normocephalic and atraumatic.   Mouth/Throat: Oropharynx is clear and moist.   Eyes: Pupils are equal, round, and reactive to light.   Neck: Neck supple.   Normal range of motion.  Cardiovascular:  Normal rate, regular rhythm, normal heart sounds and intact distal pulses.           Pulmonary/Chest: Breath sounds normal. No respiratory distress.   Abdominal: Abdomen is soft. Bowel sounds are normal. There is no abdominal tenderness. There is no rebound and no guarding.   Musculoskeletal:         General: No tenderness or edema. Normal range of motion.      Cervical back: Normal range of motion and neck supple.     Neurological: He is alert and oriented to person, place, and time. He has normal strength. No sensory deficit. GCS score is 15. GCS eye subscore is 4. GCS verbal subscore is 5. GCS motor subscore is 6.   Skin: Skin is warm and dry. Capillary refill takes less than 2 seconds.   Psychiatric: He has a normal mood and affect.       ED Course    Procedures  Labs Reviewed   COMPREHENSIVE METABOLIC PANEL - Abnormal; Notable for the following components:       Result Value    Chloride 108 (*)     Glucose Level 109 (*)     Blood Urea Nitrogen 5.9 (*)     All other components within normal limits   CBC WITH DIFFERENTIAL - Abnormal; Notable for the following components:    Hgb 13.7 (*)     MCHC 32.1 (*)     RDW 14.5 (*)     MPV 9.1 (*)     All other components within normal limits   TROPONIN I - Normal   URINALYSIS, REFLEX TO URINE CULTURE - Normal   PROTIME-INR - Normal   URINALYSIS, MICROSCOPIC - Normal   CBC W/ AUTO DIFFERENTIAL    Narrative:     The following orders were created for panel order CBC Auto Differential.  Procedure                               Abnormality         Status                     ---------                               -----------         ------                     CBC with Differential[[id number]]        Abnormal            Final result                 Please view results for these tests on the individual orders.   VALPROIC ACID     EKG Readings: (Independently Interpreted)   Initial Reading: No STEMI. Rhythm: Normal Sinus Rhythm. Heart Rate: 98. Ectopy: No Ectopy. ST Segments: Normal ST Segments. T Waves: Normal. Clinical Impression: Normal Sinus Rhythm   Preformed at 10:36. Incomplete RBBB.     Imaging Results              CT Head Without Contrast (Final result)  Result time 12/14/22 11:17:48      Final result by Jose Antonio Call MD (12/14/22 11:17:48)                   Impression:      No acute intracranial abnormality identified.  Findings of chronic microvascular ischemic disease.      Electronically signed by: Jose Antonio Call  Date:    12/14/2022  Time:    11:17               Narrative:    EXAMINATION:  CT HEAD WITHOUT CONTRAST    CLINICAL HISTORY:  Neuro deficit, acute, stroke suspected;    TECHNIQUE:  Low dose axial images were obtained through the head.  Coronal and sagittal reformations were also performed. Contrast was not  administered.    Automatic exposure control was utilized to reduce the patient's radiation dose.    DLP= 1092    COMPARISON:  08/17/2022    FINDINGS:  No acute intracranial hemorrhage, edema or mass. No acute parenchymal abnormality.    Diffuse cerebral atrophy with concordant ventricular enlargement.    There is normal gray white differentiation.    The osseous structures are normal.    The mastoid air cells are clear.    The auditory canals are patent bilaterally.    The globes and orbital contents are normal bilaterally.    The visualized maxillary, ethmoid and sphenoid sinuses are clear.                                       Medications - No data to display  Medical Decision Making:   History:   Old Medical Records: I decided to obtain old medical records.  Old Records Summarized: records from previous admission(s).       <> Summary of Records: Patient with same complaints on 8/22 with a full work up including MRI of brain and C-spine.  Differential Diagnosis:   CVA, dizziness, near-syncope, hypotension, hypertension, metabolic disorder, anemia  Independently Interpreted Test(s):   I have ordered and independently interpreted EKG Reading(s) - see prior notes  Clinical Tests:   Lab Tests: Ordered and Reviewed  Radiological Study: Reviewed and Ordered  Medical Tests: Ordered and Reviewed  ED Management:  Patient's workup is unremarkable.  Patient with the exact same presenting complaints in August with a completely unremarkable workup.  Not sure if it is medications, or perhaps other etiology, but no emergent pathology found a normal neurologic exam with NIH of 0 in the ED.  Will discharge patient to follow up with his primary care physician as an outpatient        Scribe Attestation:   Scribe #1: I performed the above scribed service and the documentation accurately describes the services I performed. I attest to the accuracy of the note.    Attending Attestation:           Physician Attestation for  Scribe:  Physician Attestation Statement for Scribe #1: I, Jonathan Franklin MD, reviewed documentation, as scribed by Margo Olivera in my presence, and it is both accurate and complete.                        Clinical Impression:   Final diagnoses:  [R42] Dizziness  [R55] Near syncope (Primary)        ED Disposition Condition    Discharge Stable          ED Prescriptions    None       Follow-up Information       Follow up With Specialties Details Why Contact Info    Suman Damon MD Family Medicine In 1 week  [address]  [place] [facility] [place] [address]  [phone]               Jonathan Franklin MD  12/14/22 0284

## 2023-01-23 ENCOUNTER — OFFICE VISIT (OUTPATIENT)
Dept: URGENT CARE | Facility: CLINIC | Age: 58
End: 2023-01-23
Payer: COMMERCIAL

## 2023-01-23 VITALS
DIASTOLIC BLOOD PRESSURE: 81 MMHG | WEIGHT: 178 LBS | HEART RATE: 107 BPM | SYSTOLIC BLOOD PRESSURE: 120 MMHG | OXYGEN SATURATION: 97 % | TEMPERATURE: 99 F | HEIGHT: 71 IN | RESPIRATION RATE: 20 BRPM | BODY MASS INDEX: 24.92 KG/M2

## 2023-01-23 DIAGNOSIS — J02.9 SORE THROAT: Primary | ICD-10-CM

## 2023-01-23 LAB
CTP QC/QA: YES
CTP QC/QA: YES
FLUAV AG NPH QL: NEGATIVE
FLUBV AG NPH QL: NEGATIVE
SARS-COV-2 RDRP RESP QL NAA+PROBE: NEGATIVE
STREP A PCR (OHS): NOT DETECTED

## 2023-01-23 PROCEDURE — 87804 INFLUENZA ASSAY W/OPTIC: CPT | Mod: 59,PBBFAC | Performed by: FAMILY MEDICINE

## 2023-01-23 PROCEDURE — 99213 OFFICE O/P EST LOW 20 MIN: CPT | Mod: S$PBB,,, | Performed by: FAMILY MEDICINE

## 2023-01-23 PROCEDURE — 99213 PR OFFICE/OUTPT VISIT, EST, LEVL III, 20-29 MIN: ICD-10-PCS | Mod: S$PBB,,, | Performed by: FAMILY MEDICINE

## 2023-01-23 PROCEDURE — 87651 STREP A DNA AMP PROBE: CPT | Performed by: FAMILY MEDICINE

## 2023-01-23 PROCEDURE — 87635 SARS-COV-2 COVID-19 AMP PRB: CPT | Mod: PBBFAC | Performed by: FAMILY MEDICINE

## 2023-01-23 PROCEDURE — 99215 OFFICE O/P EST HI 40 MIN: CPT | Mod: PBBFAC | Performed by: FAMILY MEDICINE

## 2023-01-23 RX ORDER — DEXAMETHASONE SODIUM PHOSPHATE 100 MG/10ML
8 INJECTION INTRAMUSCULAR; INTRAVENOUS
Status: COMPLETED | OUTPATIENT
Start: 2023-01-23 | End: 2023-01-23

## 2023-01-23 RX ORDER — MIRTAZAPINE 45 MG/1
45 TABLET, FILM COATED ORAL NIGHTLY
COMMUNITY
Start: 2023-01-05

## 2023-01-23 RX ORDER — BUPROPION HYDROCHLORIDE 300 MG/1
300 TABLET ORAL EVERY MORNING
COMMUNITY
Start: 2023-01-05

## 2023-01-23 RX ADMIN — DEXAMETHASONE SODIUM PHOSPHATE 8 MG: 100 INJECTION INTRAMUSCULAR; INTRAVENOUS at 04:01

## 2023-01-23 NOTE — PROGRESS NOTES
"Subjective:       Patient ID: Carlos Molina is a 57 y.o. male.    Vitals:  height is 5' 11" (1.803 m) and weight is 80.7 kg (178 lb). His oral temperature is 98.6 °F (37 °C). His blood pressure is 120/81 and his pulse is 107. His respiration is 20 and oxygen saturation is 97%.     Chief Complaint: Cough (Sore throat for 1 week)    Cough      Patient presents urgent care with 1 week of sore throat, no difficulty swallowing, occasional cough.  No fever.  No neck pain or stiffness.  No abdominal pain.  No known sick contacts.    Respiratory:  Positive for cough.      Constitutional: negative except as stated in HPI  Eye: negative except as stated in HPI  ENT: negative except as stated in HPI  Respiratory: negative except as stated in HPI  Cardiovascular: negative except as stated in HPI  Gastrointestinal: negative except as stated in HPI  Genitourinary: negative except as stated in HPI  Objective:      Physical Exam   Constitutional: He appears well-developed.  Non-toxic appearance. He does not appear ill. No distress.   HENT:   Head: Atraumatic.   Nose: No purulent discharge. Right sinus exhibits no maxillary sinus tenderness and no frontal sinus tenderness. Left sinus exhibits no maxillary sinus tenderness and no frontal sinus tenderness.   Mouth/Throat: Posterior oropharyngeal erythema (faint diffuse) present. No oropharyngeal exudate.   Eyes: Right eye exhibits no discharge. Left eye exhibits no discharge. Extraocular movement intact   Neck: Neck supple.   Cardiovascular: Regular rhythm.   Pulmonary/Chest: Effort normal and breath sounds normal. No respiratory distress. He has no wheezes. He has no rales.   Lymphadenopathy:     He has no cervical adenopathy.   Neurological: He is alert.   Skin: Skin is warm, dry and not diaphoretic.   Psychiatric: His behavior is normal.   Nursing note and vitals reviewed.      Results for orders placed or performed in visit on 01/23/23   POCT COVID-19 Rapid Screening   Result " Value Ref Range    POC Rapid COVID Negative Negative     Acceptable Yes    POCT Influenza A/B   Result Value Ref Range    Rapid Influenza A Ag Negative Negative    Rapid Influenza B Ag Negative Negative     Acceptable Yes        Assessment:       1. Sore throat            Plan:         Sore throat  -     Strep Group A by PCR  -     POCT COVID-19 Rapid Screening  -     POCT Influenza A/B  -     dexAMETHasone injection 8 mg         Will notify of strep PCR is positive and treat accordingly.  We will give Decadron IM.  Consider warm salt water gargles.  Please follow instructions on patient education material.  Return to urgent care in 2 to 3 days if symptoms are not improving, immediately if you develop any new or worsening symptoms.

## 2025-05-07 NOTE — CONSULTS
SAFETY CHECKLIST  ID Bands and Risk clasps correct and in place (DNR, Fall risk, Allergy, Latex, Limb):  Yes  All Lines Reconciled and labeled correctly: Yes  Whiteboard updated:Yes  Environmental interventions: Yes  Verify Tele #:  4     [facility] - Emergency Dept  Neurology  Consult Note    Patient Name: Carlos Molina  MRN: [id number]  Admission Date: 8/16/2022  Hospital Length of Stay: 1 days  Code Status: Full Code   Attending Provider: La Nena Hill MD   Consulting Provider: Liat Donnelly [facility]  Primary Care Physician: Suman Damon MD  Principal Problem:<principal problem not specified>    Inpatient consult to Neurology  Consult performed by: LORE Clemente  Consult ordered by: Billie Anaya [facility]         Subjective:     Chief Complaint:       HPI:   56-year-old male with PMH lumbago with sciatica, schizoaffective disorder, and chronic neck pain who presents to ED on 08/16 with C/0 slurred speech, tongue heaviness, difficulty getting words out, dry mouth, dizziness, numbness to BUE, chest pain, HA, and neck pain S/P neck injections (C4-C5, C6-C7 1/5) patient reportedly received neck injections for chronic pain for several years and has not had previously this is symptoms following injections.  Patient reports neck tenderness with movement that radiates to b/l shoulders with associated paresthesias to BUE and pain at injection site.  Patient's significant other at bedtime and reports some slurred speech earlier, however no slurred speech or dysarthria noted on exam.  Facial grimacing noted with neck flexion/extension w/o limited ROM.  Patient denies fever, chills, or body aches.  MRI brain without unrevealing for acute intracranial processes.        Past Medical History:   Diagnosis Date    Lumbago with sciatica     Schizoaffective disorder        History reviewed. No pertinent surgical history.    Review of patient's allergies indicates:  No Known Allergies    Current Neurological Medications:     No current facility-administered medications on file prior to encounter.     Current Outpatient Medications on File Prior to Encounter   Medication Sig    amoxicillin (AMOXIL) 500 MG capsule Take 500 mg by mouth 3  (three) times daily.    benztropine (COGENTIN) 1 MG tablet Take 1 mg by mouth once daily.    buPROPion (WELLBUTRIN XL) 150 MG TB24 tablet Take 150 mg by mouth every morning.    busPIRone (BUSPAR) 30 MG Tab Take 30 mg by mouth 2 (two) times daily.    divalproex ER (DEPAKOTE) 500 MG Tb24 Take 500 mg by mouth nightly.    INVEGA TRINZA 546 mg/1.75 mL Syrg Inject 1 Syringe into the muscle every 3 (three) months.    mirtazapine (REMERON) 30 MG tablet Take 30 mg by mouth nightly.    naltrexone (DEPADE) 50 mg tablet Take 25 mg by mouth once daily.    paliperidone (INVEGA) 3 MG TR24 Take 3 mg by mouth once daily.    pravastatin (PRAVACHOL) 20 MG tablet SMARTSI Tablet(s) By Mouth Every Evening     Family History    None       Tobacco Use    Smoking status: Current Some Day Smoker    Smokeless tobacco: Never Used   Substance and Sexual Activity    Alcohol use: Not on file    Drug use: Never    Sexual activity: Not on file     Review of Systems  A 14pt ros was reviewed & is negative unless o/w documented in the hpi    Objective:     Vital Signs (Most Recent):  Temp: 98.4 °F (36.9 °C) (22 1736)  Pulse: 67 (22 0701)  Resp: 17 (22 0526)  BP: 101/70 (22 0701)  SpO2: 96 % (22 0701) Vital Signs (24h Range):  Temp:  [98.4 °F (36.9 °C)] 98.4 °F (36.9 °C)  Pulse:  [64-89] 67  Resp:  [16-19] 17  SpO2:  [95 %-100 %] 96 %  BP: (101-143)/(70-95) 101/70     Weight: 83.9 kg (185 lb)  Body mass index is 27.32 kg/m².    Physical Exam  Vitals reviewed.   Constitutional:       General: He is awake.      Appearance: Normal appearance.   HENT:      Head: Normocephalic.      Nose: Nose normal.      Mouth/Throat:      Mouth: Mucous membranes are moist.      Pharynx: Oropharynx is clear.   Eyes:      Extraocular Movements: Extraocular movements intact and EOM normal.      Pupils: Pupils are equal, round, and reactive to light.   Pulmonary:      Effort: Pulmonary effort is normal.   Musculoskeletal:          General: Normal range of motion.      Cervical back: Rigidity present.   Skin:     General: Skin is warm and dry.   Neurological:      Mental Status: He is alert and oriented to person, place, and time.      Coordination: Finger-Nose-Finger Test normal.      Deep Tendon Reflexes:      Reflex Scores:       Bicep reflexes are 2+ on the right side and 2+ on the left side.       Brachioradialis reflexes are 2+ on the right side and 2+ on the left side.       Patellar reflexes are 2+ on the right side and 2+ on the left side.       Achilles reflexes are 2+ on the right side and 2+ on the left side.  Psychiatric:         Mood and Affect: Mood normal.         Speech: Speech normal.         Behavior: Behavior normal. Behavior is cooperative.         Thought Content: Thought content normal.         Judgment: Judgment normal.       NEUROLOGICAL EXAMINATION:     MENTAL STATUS   Oriented to person, place, and time.   Follows 3 step commands.   Attention: normal. Concentration: normal.   Speech: speech is normal   Level of consciousness: alert  Knowledge: good.   Normal comprehension.     CRANIAL NERVES     CN II   Visual fields full to confrontation.   Visual acuity: (visual acuity normal, w/ mild b/l blurry vision)    CN III, IV, VI   Pupils are equal, round, and reactive to light.  Extraocular motions are normal.   Nystagmus: none   Diplopia: none  Conjugate gaze: present    CN V   Facial sensation intact.     CN VII   Facial expression full, symmetric.     CN VIII   CN VIII normal.     CN IX, X   CN IX normal.   CN X normal.     CN XI   CN XI normal.     CN XII   CN XII normal.     MOTOR EXAM   Muscle bulk: normal  Overall muscle tone: normal  Right arm pronator drift: absent  Left arm pronator drift: absent    Strength   Right neck flexion: 4/5  Left neck flexion: 4/5  Right neck extension: 4/5  Left neck extension: 4/5  Right deltoid: 5/5  Left deltoid: 5/5  Right biceps: 5/5  Left biceps: 5/5  Right triceps: 5/5  Left  triceps: 5/5  Right quadriceps: 5/5  Left quadriceps: 5/5  Right hamstrin/5  Left hamstrin/5  Right glutei: 5/5  Left glutei: 5/5  Right anterior tibial: 5/5  Left anterior tibial: 5/5  Right posterior tibial: 5/5  Left posterior tibial: 5/5    REFLEXES     Reflexes   Right brachioradialis: 2+  Left brachioradialis: 2+  Right biceps: 2+  Left biceps: 2+  Right patellar: 2+  Left patellar: 2+  Right achilles: 2+  Left achilles: 2+  Right plantar: normal  Left plantar: normal  Right ankle clonus: absent  Left ankle clonus: absent    SENSORY EXAM   Light touch normal.     GAIT AND COORDINATION      Coordination   Finger to nose coordination: normal    Significant Labs:   Recent Lab Results  (Last 5 results in the past 24 hours)        22  0822  0020   22  1755   22  1740        Albumin/Globulin Ratio 1.3       1.4         Albumin 4.0       4.3         Alcohol, Serum   <10.0             Alkaline Phosphatase 54       59         ALT 16       16         Appearance, UA     Clear           AST 21       19         Bacteria, UA     None Seen           Baso # 0.04       0.04         Basophil % 0.6       0.5         BILIRUBIN TOTAL 0.4       0.3         Bilirubin, UA     Negative           BUN 6.1       8.4         Calcium 9.5       9.8         Chloride 106       108         CO2 29       26         Color, UA     Yellow           Creatinine 1.06       1.09         eGFR >60       >60         Eos # 0.07       0.06         Eosinophil % 1.0       0.7         Globulin, Total 3.2       3.0         Glucose 93       70         Glucose, UA     Negative           Hematocrit 46.4       47.2         Hemoglobin 14.6       14.9         Immature Grans (Abs) 0.02       0.01         Immature Granulocytes 0.3       0.1         Ketones, UA     Negative           Leukocytes, UA     Negative           Lymph # 3.59       3.51         LYMPH % 50.4       42.3         Magnesium 2.00               MCH  27.9       27.8         MCHC 31.5       31.6         MCV 88.7       88.1         Mono # 0.51       0.67         Mono % 7.2       8.1         MPV 9.8       10.3         Neut # 2.9       4.0         Neut % 40.5       48.3         NITRITE UA     Negative           nRBC 0.0       0.0         Occult Blood UA     Negative           pH, UA     5.5           Phosphorus 2.9               Platelets 181       195         POCT Glucose         79       Potassium 3.9       4.6         PROTEIN TOTAL 7.2       7.3         Protein, UA     Negative           RBC 5.23       5.36         RBC, UA     <5           RDW 14.2       13.9         Sodium 142       142         Specific Gravity,UA     1.015           Squam Epithel, UA     <5           Troponin I       <0.010         Urobilinogen, UA     0.2           WBC, UA     <5           WBC 7.1       8.3                                Significant Imaging:   MRI brain w/o 8/17/2022:  Findings:  Hemorrhage: No acute intracranial hemorrhage is identified.  Stroke: No abnormal signal is identified on the diffusion images to suggest acute infarct.  Extra axial spaces: The ventricles sulci and basal cisterns are within normal limits. Age appropriate.  Cerebral, cerebellar, and brainstem parenchyma: Mild scattered periventricular and subcortical white matter signal abnormalities are seen. The main consideration is small vessel ischemic changes. Stable since prior study.  Calvarium: Within normal limits.  Visualized paranasal sinuses: Normal.  Visualized orbits: The visualized orbits appear unremarkable.  Sella and skull base: Normal.  Temporal bones and mastoids: There is T2 hyperintensity of the left mastoid air cells , suggesting mastoiditis. Correlating with partial sclerosis of left mastoid air cells on prior CT study.        Impression:  1. No acute intracranial process is identified. Details and other findings as discussed above.    I have reviewed all pertinent imaging results/findings  within the past 24 hours.    Assessment and Plan:     Speech disturbance  With associated tongue heaviness, neck pain, and paresthesias to BUE S/P cervical injections  Await MRI cervical spine w w/o to r/o infection          VTE Risk Mitigation (From admission, onward)         Ordered     IP VTE LOW RISK PATIENT  Once         08/17/22 0645     Place sequential compression device  Until discontinued         08/17/22 0645                Thank you for your consult. Further recommendations to follow per MD Liat Donnelly, [facility]  Inpatient Neurology  [facility] - Emergency Dept

## 2025-07-23 ENCOUNTER — HOSPITAL ENCOUNTER (EMERGENCY)
Facility: HOSPITAL | Age: 60
Discharge: HOME | End: 2025-07-24
Attending: FAMILY MEDICINE
Payer: MEDICARE

## 2025-07-23 DIAGNOSIS — S39.012A STRAIN OF LUMBAR REGION, INITIAL ENCOUNTER: ICD-10-CM
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
## 2025-07-23 DIAGNOSIS — S16.1XXA CERVICAL STRAIN, ACUTE, INITIAL ENCOUNTER: ICD-10-CM

## 2025-07-23 PROCEDURE — 99284 EMERGENCY DEPT VISIT MOD MDM: CPT | Mod: 25

## 2025-07-23 PROCEDURE — 63600175 PHARM REV CODE 636 W HCPCS: Performed by: FAMILY MEDICINE

## 2025-07-23 PROCEDURE — 96372 THER/PROPH/DIAG INJ SC/IM: CPT | Performed by: FAMILY MEDICINE

## 2025-07-23 RX ORDER — KETOROLAC TROMETHAMINE 30 MG/ML
60 INJECTION, SOLUTION INTRAMUSCULAR; INTRAVENOUS
Status: COMPLETED | OUTPATIENT
Start: 2025-07-23 | End: 2025-07-23

## 2025-07-23 RX ORDER — METHOCARBAMOL 100 MG/ML
500 INJECTION, SOLUTION INTRAMUSCULAR; INTRAVENOUS
Status: COMPLETED | OUTPATIENT
Start: 2025-07-23 | End: 2025-07-23

## 2025-07-23 RX ORDER — DEXAMETHASONE SODIUM PHOSPHATE 4 MG/ML
8 INJECTION, SOLUTION INTRA-ARTICULAR; INTRALESIONAL; INTRAMUSCULAR; INTRAVENOUS; SOFT TISSUE
Status: COMPLETED | OUTPATIENT
Start: 2025-07-23 | End: 2025-07-23

## 2025-07-23 RX ADMIN — KETOROLAC TROMETHAMINE 60 MG: 60 INJECTION, SOLUTION INTRAMUSCULAR at 11:07

## 2025-07-23 RX ADMIN — METHOCARBAMOL 500 MG: 100 INJECTION, SOLUTION INTRAMUSCULAR; INTRAVENOUS at 11:07

## 2025-07-23 RX ADMIN — DEXAMETHASONE SODIUM PHOSPHATE 8 MG: 4 INJECTION, SOLUTION INTRA-ARTICULAR; INTRALESIONAL; INTRAMUSCULAR; INTRAVENOUS; SOFT TISSUE at 11:07

## 2025-07-24 VITALS
WEIGHT: 154 LBS | HEIGHT: 71 IN | TEMPERATURE: 98 F | OXYGEN SATURATION: 100 % | BODY MASS INDEX: 21.56 KG/M2 | SYSTOLIC BLOOD PRESSURE: 118 MMHG | DIASTOLIC BLOOD PRESSURE: 79 MMHG | RESPIRATION RATE: 18 BRPM | HEART RATE: 71 BPM

## 2025-07-24 RX ORDER — CYCLOBENZAPRINE HCL 10 MG
10 TABLET ORAL 3 TIMES DAILY PRN
Qty: 30 TABLET | Refills: 0 | Status: SHIPPED | OUTPATIENT
Start: 2025-07-24 | End: 2025-08-02

## 2025-07-24 RX ORDER — NAPROXEN 500 MG/1
500 TABLET ORAL 2 TIMES DAILY WITH MEALS
Qty: 20 TABLET | Refills: 0 | Status: SHIPPED | OUTPATIENT
Start: 2025-07-24 | End: 2025-08-02
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
## 2025-07-26 ENCOUNTER — HOSPITAL ENCOUNTER (EMERGENCY)
Facility: HOSPITAL | Age: 60
Discharge: HOME | End: 2025-07-26
Attending: FAMILY MEDICINE
Payer: MEDICARE

## 2025-07-26 VITALS
TEMPERATURE: 98 F | WEIGHT: 153.19 LBS | SYSTOLIC BLOOD PRESSURE: 135 MMHG | OXYGEN SATURATION: 99 % | DIASTOLIC BLOOD PRESSURE: 88 MMHG | RESPIRATION RATE: 18 BRPM | HEART RATE: 77 BPM | HEIGHT: 72 IN | BODY MASS INDEX: 20.75 KG/M2
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
## 2025-07-26 DIAGNOSIS — S16.1XXA NECK MUSCLE STRAIN, INITIAL ENCOUNTER: ICD-10-CM

## 2025-07-26 PROCEDURE — 99284 EMERGENCY DEPT VISIT MOD MDM: CPT | Mod: 25

## 2025-07-26 PROCEDURE — 63600175 PHARM REV CODE 636 W HCPCS: Performed by: FAMILY MEDICINE

## 2025-07-26 PROCEDURE — 96372 THER/PROPH/DIAG INJ SC/IM: CPT | Performed by: FAMILY MEDICINE

## 2025-07-26 RX ORDER — MORPHINE SULFATE 2 MG/ML
4 INJECTION, SOLUTION INTRAMUSCULAR; INTRAVENOUS
Status: COMPLETED | OUTPATIENT
Start: 2025-07-26 | End: 2025-07-26

## 2025-07-26 RX ADMIN — MORPHINE SULFATE 4 MG: 2 INJECTION, SOLUTION INTRAMUSCULAR; INTRAVENOUS at 05:07
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
## 2025-08-02 ENCOUNTER — HOSPITAL ENCOUNTER (EMERGENCY)
Facility: HOSPITAL | Age: 60
Discharge: HOME | End: 2025-08-02
Attending: INTERNAL MEDICINE
Payer: MEDICARE

## 2025-08-02 VITALS
HEIGHT: 71 IN | WEIGHT: 149.38 LBS | RESPIRATION RATE: 18 BRPM | BODY MASS INDEX: 20.91 KG/M2 | TEMPERATURE: 98 F | OXYGEN SATURATION: 100 % | SYSTOLIC BLOOD PRESSURE: 127 MMHG | DIASTOLIC BLOOD PRESSURE: 80 MMHG | HEART RATE: 100 BPM

## 2025-08-02 DIAGNOSIS — R09.82 POST-NASAL DRIP: ICD-10-CM

## 2025-08-02 DIAGNOSIS — M54.2 CERVICAL PAIN (NECK): ICD-10-CM

## 2025-08-02 DIAGNOSIS — M54.50 BILATERAL LOW BACK PAIN WITHOUT SCIATICA, UNSPECIFIED CHRONICITY: Primary | ICD-10-CM

## 2025-08-02 PROCEDURE — 63600175 PHARM REV CODE 636 W HCPCS: Mod: JZ,TB | Performed by: NURSE PRACTITIONER

## 2025-08-02 PROCEDURE — 96372 THER/PROPH/DIAG INJ SC/IM: CPT | Performed by: NURSE PRACTITIONER

## 2025-08-02 PROCEDURE — 99284 EMERGENCY DEPT VISIT MOD MDM: CPT | Mod: 25

## 2025-08-02 PROCEDURE — 25000003 PHARM REV CODE 250: Performed by: NURSE PRACTITIONER

## 2025-08-02 RX ORDER — GABAPENTIN 300 MG/1
300 CAPSULE ORAL DAILY
Qty: 14 CAPSULE | Refills: 0 | Status: SHIPPED | OUTPATIENT
Start: 2025-08-02 | End: 2025-08-02

## 2025-08-02 RX ORDER — CETIRIZINE HYDROCHLORIDE 10 MG/1
10 TABLET ORAL DAILY
Qty: 14 TABLET | Refills: 0 | Status: SHIPPED | OUTPATIENT
Start: 2025-08-02 | End: 2025-08-16

## 2025-08-02 RX ORDER — GABAPENTIN 300 MG/1
300 CAPSULE ORAL DAILY
Qty: 14 CAPSULE | Refills: 0 | Status: SHIPPED | OUTPATIENT
Start: 2025-08-02 | End: 2025-08-16

## 2025-08-02 RX ORDER — INDOMETHACIN 25 MG/1
25 CAPSULE ORAL 2 TIMES DAILY PRN
Qty: 14 CAPSULE | Refills: 0 | Status: SHIPPED | OUTPATIENT
Start: 2025-08-02

## 2025-08-02 RX ORDER — CETIRIZINE HYDROCHLORIDE 10 MG/1
10 TABLET ORAL DAILY
Qty: 14 TABLET | Refills: 0 | Status: SHIPPED | OUTPATIENT
Start: 2025-08-02 | End: 2025-08-02

## 2025-08-02 RX ORDER — BACLOFEN 10 MG/1
10 TABLET ORAL
Status: COMPLETED | OUTPATIENT
Start: 2025-08-02 | End: 2025-08-02

## 2025-08-02 RX ORDER — BACLOFEN 10 MG/1
10 TABLET ORAL 3 TIMES DAILY PRN
Qty: 21 TABLET | Refills: 0 | Status: SHIPPED | OUTPATIENT
Start: 2025-08-02

## 2025-08-02 RX ORDER — GABAPENTIN 300 MG/1
300 CAPSULE ORAL
Status: COMPLETED | OUTPATIENT
Start: 2025-08-02 | End: 2025-08-02

## 2025-08-02 RX ORDER — BACLOFEN 10 MG/1
10 TABLET ORAL 3 TIMES DAILY PRN
Qty: 21 TABLET | Refills: 0 | Status: SHIPPED | OUTPATIENT
Start: 2025-08-02 | End: 2025-08-02

## 2025-08-02 RX ORDER — KETOROLAC TROMETHAMINE 30 MG/ML
30 INJECTION, SOLUTION INTRAMUSCULAR; INTRAVENOUS
Status: COMPLETED | OUTPATIENT
Start: 2025-08-02 | End: 2025-08-02

## 2025-08-02 RX ORDER — INDOMETHACIN 25 MG/1
25 CAPSULE ORAL 2 TIMES DAILY PRN
Qty: 14 CAPSULE | Refills: 0 | Status: SHIPPED | OUTPATIENT
Start: 2025-08-02 | End: 2025-08-02

## 2025-08-02 RX ADMIN — KETOROLAC TROMETHAMINE 30 MG: 60 INJECTION, SOLUTION INTRAMUSCULAR at 11:08

## 2025-08-02 RX ADMIN — GABAPENTIN 300 MG: 300 CAPSULE ORAL at 11:08

## 2025-08-02 RX ADMIN — BACLOFEN 10 MG: 10 TABLET ORAL at 11:08
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
## 2025-08-08 ENCOUNTER — HOSPITAL ENCOUNTER (EMERGENCY)
Facility: HOSPITAL | Age: 60
Discharge: HOME | End: 2025-08-09
Attending: FAMILY MEDICINE
Payer: MEDICARE

## 2025-08-08 ENCOUNTER — HOSPITAL ENCOUNTER (EMERGENCY)
Facility: HOSPITAL | Age: 60
Discharge: LEFT BEFORE DISCHARGE | End: 2025-08-08
Payer: MEDICARE

## 2025-08-08 VITALS
HEART RATE: 94 BPM | HEIGHT: 71 IN | OXYGEN SATURATION: 100 % | HEIGHT: 71 IN | RESPIRATION RATE: 18 BRPM | WEIGHT: 151 LBS | BODY MASS INDEX: 21.12 KG/M2 | BODY MASS INDEX: 21.14 KG/M2 | OXYGEN SATURATION: 98 % | SYSTOLIC BLOOD PRESSURE: 133 MMHG | DIASTOLIC BLOOD PRESSURE: 82 MMHG | WEIGHT: 150.88 LBS | TEMPERATURE: 98 F | DIASTOLIC BLOOD PRESSURE: 80 MMHG | RESPIRATION RATE: 18 BRPM | HEART RATE: 100 BPM | SYSTOLIC BLOOD PRESSURE: 126 MMHG | TEMPERATURE: 97 F

## 2025-08-08 DIAGNOSIS — M54.41 BILATERAL LOW BACK PAIN WITH BILATERAL SCIATICA, UNSPECIFIED CHRONICITY: Primary | ICD-10-CM

## 2025-08-08 DIAGNOSIS — M54.42 BILATERAL LOW BACK PAIN WITH BILATERAL SCIATICA, UNSPECIFIED CHRONICITY: Primary | ICD-10-CM

## 2025-08-08 PROCEDURE — 99285 EMERGENCY DEPT VISIT HI MDM: CPT

## 2025-08-08 PROCEDURE — 99900041 HC LEFT WITHOUT BEING SEEN- EMERGENCY

## 2025-08-09 PROCEDURE — 63600175 PHARM REV CODE 636 W HCPCS: Performed by: NURSE PRACTITIONER

## 2025-08-09 PROCEDURE — 96372 THER/PROPH/DIAG INJ SC/IM: CPT | Performed by: NURSE PRACTITIONER

## 2025-08-09 PROCEDURE — 25000003 PHARM REV CODE 250: Performed by: NURSE PRACTITIONER

## 2025-08-09 RX ORDER — LIDOCAINE 50 MG/G
1 PATCH TOPICAL
Status: DISCONTINUED | OUTPATIENT
Start: 2025-08-09 | End: 2025-08-09 | Stop reason: HOSPADM

## 2025-08-09 RX ORDER — PREDNISONE 10 MG/1
20 TABLET ORAL
Status: COMPLETED | OUTPATIENT
Start: 2025-08-09 | End: 2025-08-09

## 2025-08-09 RX ORDER — PREDNISONE 20 MG/1
20 TABLET ORAL DAILY
Qty: 5 TABLET | Refills: 0 | Status: SHIPPED | OUTPATIENT
Start: 2025-08-09 | End: 2025-08-14

## 2025-08-09 RX ORDER — LIDOCAINE 50 MG/G
1 PATCH TOPICAL DAILY
Qty: 5 PATCH | Refills: 0 | Status: SHIPPED | OUTPATIENT
Start: 2025-08-09

## 2025-08-09 RX ORDER — METHOCARBAMOL 100 MG/ML
500 INJECTION, SOLUTION INTRAMUSCULAR; INTRAVENOUS
Status: COMPLETED | OUTPATIENT
Start: 2025-08-09 | End: 2025-08-09

## 2025-08-09 RX ORDER — KETOROLAC TROMETHAMINE 30 MG/ML
30 INJECTION, SOLUTION INTRAMUSCULAR; INTRAVENOUS
Status: COMPLETED | OUTPATIENT
Start: 2025-08-09 | End: 2025-08-09

## 2025-08-09 RX ADMIN — KETOROLAC TROMETHAMINE 30 MG: 60 INJECTION, SOLUTION INTRAMUSCULAR at 12:08

## 2025-08-09 RX ADMIN — METHOCARBAMOL 500 MG: 100 INJECTION, SOLUTION INTRAMUSCULAR; INTRAVENOUS at 12:08

## 2025-08-09 RX ADMIN — LIDOCAINE 1 PATCH: 50 PATCH CUTANEOUS at 12:08

## 2025-08-09 RX ADMIN — PREDNISONE 20 MG: 10 TABLET ORAL at 12:08
